# Patient Record
Sex: FEMALE | Employment: OTHER | URBAN - METROPOLITAN AREA
[De-identification: names, ages, dates, MRNs, and addresses within clinical notes are randomized per-mention and may not be internally consistent; named-entity substitution may affect disease eponyms.]

---

## 2017-04-29 ENCOUNTER — HOSPITAL ENCOUNTER (OUTPATIENT)
Dept: WOMENS IMAGING | Age: 67
Discharge: HOME | End: 2017-04-29
Attending: INTERNAL MEDICINE | Admitting: INTERNAL MEDICINE

## 2017-04-29 DIAGNOSIS — Z12.31 ENCOUNTER FOR SCREENING MAMMOGRAM FOR HIGH-RISK PATIENT: ICD-10-CM

## 2018-08-29 ENCOUNTER — HOSPITAL ENCOUNTER (OUTPATIENT)
Dept: WOMENS IMAGING | Age: 68
Discharge: HOME | End: 2018-08-29
Attending: OBSTETRICS & GYNECOLOGY | Admitting: OBSTETRICS & GYNECOLOGY

## 2018-08-29 DIAGNOSIS — Z12.31 VISIT FOR SCREENING MAMMOGRAM: ICD-10-CM

## 2019-01-04 ENCOUNTER — HOSPITAL ENCOUNTER (OUTPATIENT)
Dept: WOMENS IMAGING | Age: 69
Discharge: HOME | End: 2019-01-04
Payer: MEDICARE

## 2019-01-04 DIAGNOSIS — Z78.0 ASYMPTOMATIC MENOPAUSAL STATE: ICD-10-CM

## 2019-01-04 PROCEDURE — 77080 DXA BONE DENSITY AXIAL: CPT

## 2019-01-31 DIAGNOSIS — M81.0 SENILE OSTEOPOROSIS: ICD-10-CM

## 2019-01-31 RX ORDER — 0.9 % SODIUM CHLORIDE 0.9 %
10 VIAL (ML) INJECTION ONCE
Status: CANCELLED | OUTPATIENT
Start: 2019-01-31 | End: 2019-01-31

## 2019-01-31 RX ORDER — SODIUM CHLORIDE 9 MG/ML
100 INJECTION, SOLUTION INTRAVENOUS CONTINUOUS
Status: CANCELLED | OUTPATIENT
Start: 2019-01-31

## 2019-01-31 RX ORDER — METHYLPREDNISOLONE SODIUM SUCCINATE 125 MG/2ML
125 INJECTION, POWDER, LYOPHILIZED, FOR SOLUTION INTRAMUSCULAR; INTRAVENOUS ONCE
Status: CANCELLED | OUTPATIENT
Start: 2019-01-31 | End: 2019-01-31

## 2019-01-31 RX ORDER — DIPHENHYDRAMINE HYDROCHLORIDE 50 MG/ML
50 INJECTION INTRAMUSCULAR; INTRAVENOUS ONCE
Status: CANCELLED | OUTPATIENT
Start: 2019-01-31 | End: 2019-01-31

## 2019-01-31 RX ORDER — EPINEPHRINE 1 MG/ML
0.3 INJECTION, SOLUTION, CONCENTRATE INTRAVENOUS PRN
Status: CANCELLED | OUTPATIENT
Start: 2019-01-31

## 2019-03-07 ENCOUNTER — HOSPITAL ENCOUNTER (OUTPATIENT)
Dept: INFUSION THERAPY | Age: 69
Setting detail: INFUSION SERIES
Discharge: HOME | End: 2019-03-07
Payer: MEDICARE

## 2019-03-07 ENCOUNTER — HOSPITAL ENCOUNTER (OUTPATIENT)
Dept: INFUSION THERAPY | Age: 69
Setting detail: INFUSION SERIES
End: 2019-03-07
Payer: MEDICARE

## 2019-03-07 VITALS
DIASTOLIC BLOOD PRESSURE: 75 MMHG | HEIGHT: 65 IN | WEIGHT: 137 LBS | SYSTOLIC BLOOD PRESSURE: 111 MMHG | BODY MASS INDEX: 22.82 KG/M2 | RESPIRATION RATE: 17 BRPM | OXYGEN SATURATION: 94 % | TEMPERATURE: 97.5 F | HEART RATE: 71 BPM

## 2019-03-07 DIAGNOSIS — M81.0 SENILE OSTEOPOROSIS: Primary | ICD-10-CM

## 2019-03-07 PROCEDURE — 96372 THER/PROPH/DIAG INJ SC/IM: CPT

## 2019-03-07 PROCEDURE — 6360000002 HC RX W HCPCS: Performed by: OBSTETRICS & GYNECOLOGY

## 2019-03-07 RX ORDER — SODIUM CHLORIDE 9 MG/ML
100 INJECTION, SOLUTION INTRAVENOUS CONTINUOUS
Status: CANCELLED | OUTPATIENT
Start: 2019-03-07

## 2019-03-07 RX ORDER — EPINEPHRINE 1 MG/ML
0.3 INJECTION, SOLUTION, CONCENTRATE INTRAVENOUS PRN
Status: CANCELLED | OUTPATIENT
Start: 2019-03-07

## 2019-03-07 RX ORDER — FLUVOXAMINE MALEATE 100 MG
100 TABLET ORAL 2 TIMES DAILY
COMMUNITY

## 2019-03-07 RX ORDER — 0.9 % SODIUM CHLORIDE 0.9 %
10 VIAL (ML) INJECTION ONCE
Status: CANCELLED | OUTPATIENT
Start: 2019-03-07 | End: 2019-03-07

## 2019-03-07 RX ORDER — LEVOTHYROXINE SODIUM 0.12 MG/1
125 TABLET ORAL DAILY
COMMUNITY

## 2019-03-07 RX ORDER — CLONAZEPAM 0.5 MG/1
0.5 TABLET ORAL NIGHTLY PRN
Status: ON HOLD | COMMUNITY
End: 2022-06-14

## 2019-03-07 RX ORDER — METHYLPREDNISOLONE SODIUM SUCCINATE 125 MG/2ML
125 INJECTION, POWDER, LYOPHILIZED, FOR SOLUTION INTRAMUSCULAR; INTRAVENOUS ONCE
Status: CANCELLED | OUTPATIENT
Start: 2019-03-07 | End: 2019-03-07

## 2019-03-07 RX ORDER — MULTIVIT-MIN/IRON/FOLIC ACID/K 18-600-40
4000 CAPSULE ORAL DAILY
COMMUNITY

## 2019-03-07 RX ORDER — METOPROLOL SUCCINATE 50 MG/1
50 TABLET, EXTENDED RELEASE ORAL NIGHTLY
COMMUNITY

## 2019-03-07 RX ORDER — IBUPROFEN 200 MG
1 CAPSULE ORAL 2 TIMES DAILY
COMMUNITY

## 2019-03-07 RX ORDER — DIPHENHYDRAMINE HYDROCHLORIDE 50 MG/ML
50 INJECTION INTRAMUSCULAR; INTRAVENOUS ONCE
Status: CANCELLED | OUTPATIENT
Start: 2019-03-07 | End: 2019-03-07

## 2019-03-07 RX ADMIN — DENOSUMAB 60 MG: 60 INJECTION SUBCUTANEOUS at 09:19

## 2019-03-07 NOTE — PROGRESS NOTES
Outpatient [id number] [place] [facility]     Prolia Visit    NAME:  Jonah Nicholson  YOB: 1950  MEDICAL RECORD NUMBER:  [id number]  Episode Date:  3/7/2019    Patient arrived to [facility] 58   [] per wheelchair   [x] ambulatory     Is this the patient's first Prolia Injection? Yes     Date of last Prolia N/A    Did the patient experience any adverse reactions to Prolia Injection? NA    Any recent oral or dental surgery? no    Any recent active fever, infections and/or illnesses? No    Patient has history of pathological fracture? No    Patient has had a recent fracture due to trauma or injury? No    Patient has had a recent orthopedic surgery or procedure done? No    Approximate date of last Dexa scan? 1/4/19       There were no vitals taken for this visit. Current Lab Data:    Calcium: 9.1 on 2/28/19    Patient Currently taking Calcium Supplements? Yes      Prolia administered: Yes    Prolia dosage: 60 mg was administered slowly subcutaneously into the left upper arm. Response to treatment:  Well tolerated by patient. Due for next dose of Prolia on September 8, 2019.      Electronically signed by Ian Castro RN on 3/7/2019 at 9:10 AM

## 2019-08-21 RX ORDER — SODIUM CHLORIDE 9 MG/ML
INJECTION, SOLUTION INTRAVENOUS CONTINUOUS
Status: CANCELLED | OUTPATIENT
Start: 2019-08-21

## 2019-08-21 RX ORDER — METHYLPREDNISOLONE SODIUM SUCCINATE 125 MG/2ML
125 INJECTION, POWDER, LYOPHILIZED, FOR SOLUTION INTRAMUSCULAR; INTRAVENOUS ONCE
Status: CANCELLED | OUTPATIENT
Start: 2019-08-21

## 2019-08-21 RX ORDER — DIPHENHYDRAMINE HYDROCHLORIDE 50 MG/ML
50 INJECTION INTRAMUSCULAR; INTRAVENOUS ONCE
Status: CANCELLED | OUTPATIENT
Start: 2019-08-21

## 2019-08-21 RX ORDER — EPINEPHRINE 1 MG/ML
0.3 INJECTION, SOLUTION, CONCENTRATE INTRAVENOUS PRN
Status: CANCELLED | OUTPATIENT
Start: 2019-08-21

## 2019-09-13 ENCOUNTER — HOSPITAL ENCOUNTER (OUTPATIENT)
Dept: INFUSION THERAPY | Age: 69
Setting detail: INFUSION SERIES
Discharge: HOME | End: 2019-09-13
Payer: MEDICARE

## 2019-09-13 VITALS
RESPIRATION RATE: 18 BRPM | HEART RATE: 60 BPM | TEMPERATURE: 98.3 F | DIASTOLIC BLOOD PRESSURE: 74 MMHG | SYSTOLIC BLOOD PRESSURE: 115 MMHG

## 2019-09-13 DIAGNOSIS — M81.0 SENILE OSTEOPOROSIS: Primary | ICD-10-CM

## 2019-09-13 RX ORDER — DIPHENHYDRAMINE HYDROCHLORIDE 50 MG/ML
50 INJECTION INTRAMUSCULAR; INTRAVENOUS ONCE
Status: CANCELLED | OUTPATIENT
Start: 2020-03-13

## 2019-09-13 RX ORDER — EPINEPHRINE 1 MG/ML
0.3 INJECTION, SOLUTION, CONCENTRATE INTRAVENOUS PRN
Status: CANCELLED | OUTPATIENT
Start: 2020-03-13

## 2019-09-13 RX ORDER — METHYLPREDNISOLONE SODIUM SUCCINATE 125 MG/2ML
125 INJECTION, POWDER, LYOPHILIZED, FOR SOLUTION INTRAMUSCULAR; INTRAVENOUS ONCE
Status: CANCELLED | OUTPATIENT
Start: 2020-03-13

## 2019-09-13 RX ORDER — SODIUM CHLORIDE 9 MG/ML
INJECTION, SOLUTION INTRAVENOUS CONTINUOUS
Status: CANCELLED | OUTPATIENT
Start: 2020-03-13

## 2019-09-13 NOTE — PROGRESS NOTES
Outpatient [id number] [place] [employer] [facility]     Prolia Visit    NAME:  Viola George  YOB: 1950  MEDICAL RECORD NUMBER:  [id number]  Episode Date:  9/13/2019    Patient arrived to [facility] 58   [] per wheelchair   [x] ambulatory     Is this the patient's first Prolia Injection? No     Date of last Prolia Injection ? March 7, 2019. Did the patient experience any adverse reactions to Prolia Injection? No    Any recent oral or dental surgery? Yes had a molar removed 2-3 weeks ago and has had 2 weeks of antibiotics. Still having soreness where tooth removed. Patient states she had her Tysabri infusion yesterday for her MS. Explained to patient the risk of jaw infection is greater with recent pulled tooth and Tysabri is a monoclonal antibody which is also an immunosuppressant. Would be better if we wait for pulled tooth to heal. Patient is going to call dentist since tooth is still sore. She will call us when tooth is healed. /74   Pulse 60   Temp 98.3 °F (36.8 °C) (Oral)   Resp 18     Current Lab Data:    Calcium:    Lab Results   Component Value Date    CALCIUM 8.2 04/04/2015     Prolia was held today until tooth is healed. Patient will call us when she is ready.        Electronically signed by Carolina Mclaughlin RN on 9/13/2019 at 4:59 PM

## 2019-09-16 ENCOUNTER — HOSPITAL ENCOUNTER (OUTPATIENT)
Dept: WOMENS IMAGING | Age: 69
Discharge: HOME | End: 2019-09-16
Payer: MEDICARE

## 2019-09-16 DIAGNOSIS — Z12.39 BREAST CANCER SCREENING: ICD-10-CM

## 2019-09-16 PROCEDURE — 77063 BREAST TOMOSYNTHESIS BI: CPT

## 2019-10-15 ENCOUNTER — HOSPITAL ENCOUNTER (OUTPATIENT)
Dept: INFUSION THERAPY | Age: 69
Setting detail: INFUSION SERIES
Discharge: HOME | End: 2019-10-15
Payer: MEDICARE

## 2019-10-15 VITALS
HEART RATE: 47 BPM | OXYGEN SATURATION: 100 % | RESPIRATION RATE: 18 BRPM | DIASTOLIC BLOOD PRESSURE: 60 MMHG | SYSTOLIC BLOOD PRESSURE: 124 MMHG | TEMPERATURE: 96 F

## 2019-10-15 DIAGNOSIS — M81.0 SENILE OSTEOPOROSIS: Primary | ICD-10-CM

## 2019-10-15 PROCEDURE — 6360000002 HC RX W HCPCS: Performed by: OBSTETRICS & GYNECOLOGY

## 2019-10-15 PROCEDURE — 96372 THER/PROPH/DIAG INJ SC/IM: CPT

## 2019-10-15 RX ORDER — METHYLPREDNISOLONE SODIUM SUCCINATE 125 MG/2ML
125 INJECTION, POWDER, LYOPHILIZED, FOR SOLUTION INTRAMUSCULAR; INTRAVENOUS ONCE
Status: CANCELLED | OUTPATIENT
Start: 2020-03-11

## 2019-10-15 RX ORDER — SODIUM CHLORIDE 9 MG/ML
INJECTION, SOLUTION INTRAVENOUS CONTINUOUS
Status: CANCELLED | OUTPATIENT
Start: 2020-03-11

## 2019-10-15 RX ORDER — CLOMIPRAMINE HYDROCHLORIDE 75 MG/1
250 CAPSULE ORAL DAILY
COMMUNITY
End: 2020-08-19

## 2019-10-15 RX ORDER — EPINEPHRINE 1 MG/ML
0.3 INJECTION, SOLUTION, CONCENTRATE INTRAVENOUS PRN
Status: CANCELLED | OUTPATIENT
Start: 2020-03-11

## 2019-10-15 RX ORDER — DIPHENHYDRAMINE HYDROCHLORIDE 50 MG/ML
50 INJECTION INTRAMUSCULAR; INTRAVENOUS ONCE
Status: CANCELLED | OUTPATIENT
Start: 2020-03-11

## 2019-10-15 RX ADMIN — DENOSUMAB 60 MG: 60 INJECTION SUBCUTANEOUS at 11:45

## 2019-10-15 NOTE — PROGRESS NOTES
Outpatient [id number] [place] [facility]     Prolia Visit    NAME:  Josefina Angulo  YOB: 1950  MEDICAL RECORD NUMBER:  [id number]  Episode Date:  10/15/2019    Patient arrived to [facility] 58   [] per wheelchair   [x] ambulatory  With cane    Is this the patient's first Prolia Injection? No     Date of last Prolia Injection ? March 7, 2019. Did the patient experience any adverse reactions to Prolia Injection? No    Any recent oral or dental surgery? Yes , Molar removed 8/27/19 and was on antibiotics for a while. Came for Prolia on 9/13/19 but prolia was held because she was still having a lot of soreness especially when trying to chew food. She is also on Tsabri infusions monthly for MS which is also an immunosuppressant. States tooth site is completely healed now and she is able to chew food without any problems. Any recent active fever, infections and/or illnesses? No    Patient has history of pathological fracture? No    Patient has had a recent fracture due to trauma or injury? No    Patient has had a recent orthopedic surgery or procedure done? No    Approximate date of last Dexa scan? 1/4/19      /60   Pulse (!) 47   Temp 96 °F (35.6 °C) (Oral)   Resp 18   SpO2 100%     Current Lab Data: from 8/19/19    Calcium:  9.4    Patient Currently taking Calcium Supplements? Yes and also on Vit D daily. Prolia administered: Yes    Prolia dosage: 60 mg was administered slowly subcutaneously into the left upper arm. Response to treatment:  Well tolerated by patient. Due for next dose of Prolia in April , 2020.      Electronically signed by Huong Tirado RN on 10/15/2019 at 11:59 am.

## 2020-08-19 NOTE — PROGRESS NOTES
[address] [facility] time____0830________        Surgery time___0930_________    Take the following medications with a sip of water:    Do not eat or drink anything after 12:00 midnight prior to your surgery. except prep  This includes water chewing gum, mints and ice chips. You may brush your teeth and gargle the morning of your surgery, but do not swallow the water      You may be asked to stop blood thinners such as Coumadin, Plavix, Fragmin, Lovenox, etc., or any anti-inflammatories such as:  Aspirin, Ibuprofen, Advil, Naproxen prior to your surgery. We also ask that you stop any OTC medications such as fish oil, vitamin E, glucosamine, garlic, Multivitamins, COQ 10, etc.    We ask that you do not smoke 24 hours prior to surgery  We ask that you do not  drink any alcoholic beverages 24 hours prior to surgery     You must make arrangements for a responsible adult to take you home after your surgery. For your safety you will not be allowed to leave alone or drive yourself home. Your surgery will be cancelled if you do not have a ride home. Also for your safety, it is strongly suggested that someone stay with you the first 24 hours after your surgery. A parent or legal guardian must accompany a child scheduled for surgery and plan to stay at the hospital until the child is discharged. Please do not bring other children with you. For your comfort, please wear simple loose fitting clothing to the hospital.  Please do not bring valuables. Do not wear any make-up or nail polish on your fingers or toes      For your safety, please do not wear any jewelry or body piercing's on the day of surgery. All jewelry must be removed. If you have dentures, they will be removed before going to operating room. For your convenience, we will provide you with a container.     If you wear contact lenses or glasses, they will be removed, please bring a case for

## 2020-08-20 ENCOUNTER — OFFICE VISIT (OUTPATIENT)
Dept: PRIMARY CARE CLINIC | Age: 70
End: 2020-08-20
Payer: COMMERCIAL

## 2020-08-20 PROCEDURE — G8420 CALC BMI NORM PARAMETERS: HCPCS | Performed by: NURSE PRACTITIONER

## 2020-08-20 PROCEDURE — 99211 OFF/OP EST MAY X REQ PHY/QHP: CPT | Performed by: NURSE PRACTITIONER

## 2020-08-20 PROCEDURE — G8428 CUR MEDS NOT DOCUMENT: HCPCS | Performed by: NURSE PRACTITIONER

## 2020-08-20 NOTE — PROGRESS NOTES
Patient presented to [facility] drive up clinic for preop testing. Patient was swabbed and given information advising them to remain isolated until procedure date.

## 2020-08-24 LAB — SARS-COV-2, NAA: NOT DETECTED

## 2020-08-25 ENCOUNTER — ANESTHESIA EVENT (OUTPATIENT)
Dept: ENDOSCOPY | Age: 70
End: 2020-08-25
Payer: MEDICARE

## 2020-08-26 ENCOUNTER — ANESTHESIA (OUTPATIENT)
Dept: ENDOSCOPY | Age: 70
End: 2020-08-26
Payer: MEDICARE

## 2020-08-26 ENCOUNTER — HOSPITAL ENCOUNTER (OUTPATIENT)
Age: 70
Setting detail: OUTPATIENT SURGERY
Discharge: HOME | End: 2020-08-26
Attending: INTERNAL MEDICINE | Admitting: INTERNAL MEDICINE
Payer: MEDICARE

## 2020-08-26 VITALS
BODY MASS INDEX: 24.83 KG/M2 | HEIGHT: 65 IN | TEMPERATURE: 97.8 F | WEIGHT: 149 LBS | SYSTOLIC BLOOD PRESSURE: 115 MMHG | RESPIRATION RATE: 16 BRPM | OXYGEN SATURATION: 98 % | HEART RATE: 73 BPM | DIASTOLIC BLOOD PRESSURE: 53 MMHG

## 2020-08-26 VITALS
RESPIRATION RATE: 15 BRPM | SYSTOLIC BLOOD PRESSURE: 137 MMHG | DIASTOLIC BLOOD PRESSURE: 77 MMHG | OXYGEN SATURATION: 99 %

## 2020-08-26 PROCEDURE — 7100000010 HC PHASE II RECOVERY - FIRST 15 MIN: Performed by: INTERNAL MEDICINE

## 2020-08-26 PROCEDURE — 6360000002 HC RX W HCPCS: Performed by: NURSE ANESTHETIST, CERTIFIED REGISTERED

## 2020-08-26 PROCEDURE — 2580000003 HC RX 258: Performed by: ANESTHESIOLOGY

## 2020-08-26 PROCEDURE — 3700000000 HC ANESTHESIA ATTENDED CARE: Performed by: INTERNAL MEDICINE

## 2020-08-26 PROCEDURE — 3700000001 HC ADD 15 MINUTES (ANESTHESIA): Performed by: INTERNAL MEDICINE

## 2020-08-26 PROCEDURE — 2500000003 HC RX 250 WO HCPCS: Performed by: NURSE ANESTHETIST, CERTIFIED REGISTERED

## 2020-08-26 PROCEDURE — 3609027000 HC COLONOSCOPY: Performed by: INTERNAL MEDICINE

## 2020-08-26 PROCEDURE — 7100000011 HC PHASE II RECOVERY - ADDTL 15 MIN: Performed by: INTERNAL MEDICINE

## 2020-08-26 RX ORDER — SODIUM CHLORIDE 0.9 % (FLUSH) 0.9 %
10 SYRINGE (ML) INJECTION PRN
Status: DISCONTINUED | OUTPATIENT
Start: 2020-08-26 | End: 2020-08-26 | Stop reason: HOSPADM

## 2020-08-26 RX ORDER — LIDOCAINE HYDROCHLORIDE 20 MG/ML
INJECTION, SOLUTION EPIDURAL; INFILTRATION; INTRACAUDAL; PERINEURAL PRN
Status: DISCONTINUED | OUTPATIENT
Start: 2020-08-26 | End: 2020-08-26 | Stop reason: SDUPTHER

## 2020-08-26 RX ORDER — GLYCOPYRROLATE 0.2 MG/ML
INJECTION INTRAMUSCULAR; INTRAVENOUS PRN
Status: DISCONTINUED | OUTPATIENT
Start: 2020-08-26 | End: 2020-08-26 | Stop reason: SDUPTHER

## 2020-08-26 RX ORDER — SODIUM CHLORIDE 9 MG/ML
INJECTION, SOLUTION INTRAVENOUS CONTINUOUS
Status: DISCONTINUED | OUTPATIENT
Start: 2020-08-26 | End: 2020-08-26 | Stop reason: HOSPADM

## 2020-08-26 RX ORDER — SODIUM CHLORIDE 0.9 % (FLUSH) 0.9 %
10 SYRINGE (ML) INJECTION EVERY 12 HOURS SCHEDULED
Status: DISCONTINUED | OUTPATIENT
Start: 2020-08-26 | End: 2020-08-26 | Stop reason: HOSPADM

## 2020-08-26 RX ORDER — PROPOFOL 10 MG/ML
INJECTION, EMULSION INTRAVENOUS PRN
Status: DISCONTINUED | OUTPATIENT
Start: 2020-08-26 | End: 2020-08-26 | Stop reason: SDUPTHER

## 2020-08-26 RX ADMIN — LIDOCAINE HYDROCHLORIDE 50 MG: 20 INJECTION, SOLUTION EPIDURAL; INFILTRATION; INTRACAUDAL; PERINEURAL at 09:40

## 2020-08-26 RX ADMIN — GLYCOPYRROLATE 0.2 MG: 0.2 INJECTION, SOLUTION INTRAMUSCULAR; INTRAVENOUS at 09:45

## 2020-08-26 RX ADMIN — PROPOFOL 20 MG: 10 INJECTION, EMULSION INTRAVENOUS at 09:44

## 2020-08-26 RX ADMIN — PROPOFOL 50 MG: 10 INJECTION, EMULSION INTRAVENOUS at 09:40

## 2020-08-26 RX ADMIN — PROPOFOL 20 MG: 10 INJECTION, EMULSION INTRAVENOUS at 09:42

## 2020-08-26 RX ADMIN — SODIUM CHLORIDE: 9 INJECTION, SOLUTION INTRAVENOUS at 09:21

## 2020-08-26 RX ADMIN — PROPOFOL 20 MG: 10 INJECTION, EMULSION INTRAVENOUS at 09:46

## 2020-08-26 RX ADMIN — PROPOFOL 20 MG: 10 INJECTION, EMULSION INTRAVENOUS at 09:48

## 2020-08-26 ASSESSMENT — PAIN SCALES - WONG BAKER
WONGBAKER_NUMERICALRESPONSE: 0

## 2020-08-26 ASSESSMENT — PAIN SCALES - GENERAL
PAINLEVEL_OUTOF10: 0

## 2020-08-26 ASSESSMENT — PAIN - FUNCTIONAL ASSESSMENT: PAIN_FUNCTIONAL_ASSESSMENT: 0-10

## 2020-08-26 NOTE — H&P
[facility]   Pre-operative History and Physical    Patient: Gaby Welch  : 1950  Acct#: [de-identified]    History Obtained From: electronic medical record    HISTORY OF PRESENT ILLNESS  Procedure:Colonoscopy  Indications:surveillance  Past Medical History:        Diagnosis Date    Multiple sclerosis ([address]     OCD (obsessive compulsive disorder)     Osteopenia     Syncopal episodes     Thyroid disease      Past Surgical History:        Procedure Laterality Date     SECTION      COLONOSCOPY      SINUS SURGERY      TONSILLECTOMY       Medications prior to admission:   Prior to Admission medications    Medication Sig Start Date End Date Taking? Authorizing Provider   levothyroxine (SYNTHROID) 125 MCG tablet Take 125 mcg by mouth Daily   Yes Historical Provider, MD   fluvoxaMINE (LUVOX) 100 MG tablet Take 100 mg by mouth 2 times daily   Yes Historical Provider, MD   natalizumab (TYSABRI) 300 MG/15ML injection Infuse intravenously every 28 days   Yes Historical Provider, MD   metoprolol succinate (TOPROL XL) 50 MG extended release tablet Take 50 mg by mouth nightly   Yes Historical Provider, MD   clonazePAM (KLONOPIN) 0.5 MG tablet Take 0.5 mg by mouth 2 times daily as needed. Yes Historical Provider, MD   calcium citrate (CALCITRATE) 950 MG tablet Take 1 tablet by mouth 2 times daily    Yes Historical Provider, MD   Cholecalciferol (VITAMIN D) 2000 units CAPS capsule Take 4,000 capsules by mouth daily   Yes Historical Provider, MD   clonazePAM (KLONOPIN) 1 MG tablet  3/13/15  Yes Historical Provider, MD   docusate sodium (COLACE) 100 MG capsule Take 1 capsule by mouth 2 times daily. 4/4/15  Yes Stone Amaya PA-C     Allergies:   Patient has no known allergies.     Social History     Socioeconomic History    Marital status:      Spouse name: Not on file    Number of children: Not on file    Years of education: Not on file    Highest education level: Not on file Occupational History    Not on file   Social Needs    Financial resource strain: Not on file    Food insecurity     Worry: Not on file     Inability: Not on file    Transportation needs     Medical: Not on file     Non-medical: Not on file   Tobacco Use    Smoking status: Former Smoker    Smokeless tobacco: Never Used   Substance and Sexual Activity    Alcohol use: No    Drug use: No    Sexual activity: Not on file   Lifestyle    Physical activity     Days per week: Not on file     Minutes per session: Not on file    Stress: Not on file   Relationships    Social connections     Talks on phone: Not on file     Gets together: Not on file     Attends Orthodoxy service: Not on file     Active member of club or organization: Not on file     Attends meetings of clubs or organizations: Not on file     Relationship status: Not on file    Intimate partner violence     Fear of current or ex partner: Not on file     Emotionally abused: Not on file     Physically abused: Not on file     Forced sexual activity: Not on file   Other Topics Concern    Not on file   Social History Narrative    Not on file     Family History   Problem Relation Age of Onset    Stroke Mother     Other Father         alheizmers         PHYSICAL EXAM:      BP (!) 113/50   Pulse 64   Temp 98 °F (36.7 °C) (Temporal)   Resp 16   Ht 5' 5\" (1.651 m)   Wt 149 lb (67.6 kg)   SpO2 97%   BMI 24.79 kg/m²  I        Heart:normal    Lungs: normal    Abdomen: normal      ASA Grade:  See anesthesia note      ASSESSMENT AND PLAN:    1. Procedure options, risks and benefits reviewed with patient and expresses understanding.

## 2020-08-26 NOTE — ANESTHESIA PRE PROCEDURE
mouth 2 times daily as needed.  calcium citrate (CALCITRATE) 950 MG tablet Take 1 tablet by mouth 2 times daily       Cholecalciferol (VITAMIN D) 2000 units CAPS capsule Take 4,000 capsules by mouth daily      clonazePAM (KLONOPIN) 1 MG tablet       docusate sodium (COLACE) 100 MG capsule Take 1 capsule by mouth 2 times daily. 20 capsule 0       Allergies:  No Known Allergies    Problem List:    Patient Active Problem List   Diagnosis Code    Senile osteoporosis M81.0       Past Medical History:        Diagnosis Date    Multiple sclerosis [address] [address]     OCD (obsessive compulsive disorder)     Osteopenia     Syncopal episodes     Thyroid disease        Past Surgical History:        Procedure Laterality Date     SECTION      COLONOSCOPY      SINUS SURGERY      TONSILLECTOMY         Social History:    Social History     Tobacco Use    Smoking status: Former Smoker    Smokeless tobacco: Never Used   Substance Use Topics    Alcohol use: No                                Counseling given: Not Answered      Vital Signs (Current):   Vitals:    20 0929   Weight: 149 lb (67.6 kg)   Height: 5' 5\" (1.651 m)                                              BP Readings from Last 3 Encounters:   10/15/19 124/60   19 115/74   19 111/75       NPO Status:                                                                                 BMI:   Wt Readings from Last 3 Encounters:   19 137 lb (62.1 kg)   05/04/15 197 lb (89.4 kg)   04/09/15 191 lb (86.6 kg)     Body mass index is 24.79 kg/m².     CBC:   Lab Results   Component Value Date    WBC 9.4 2015    RBC 4.03 2015    HGB 13.4 2015    HCT 38.6 2015    MCV 95.8 2015    RDW 12.7 2015     2015       CMP:   Lab Results   Component Value Date     2015    K 4.1 2015     2015    CO2 25 2015    BUN 13 2015    CREATININE 0.6 2015    GFRAA >60 2015 AGRATIO 1.3 04/04/2015    LABGLOM >60 04/04/2015    GLUCOSE 111 04/04/2015    PROT 6.1 04/04/2015    CALCIUM 8.2 04/04/2015    BILITOT 0.3 04/04/2015    ALKPHOS 55 04/04/2015    AST 23 04/04/2015    ALT 16 04/04/2015       POC Tests: No results for input(s): POCGLU, POCNA, POCK, POCCL, POCBUN, POCHEMO, POCHCT in the last 72 hours. Coags: No results found for: PROTIME, INR, APTT    HCG (If Applicable): No results found for: PREGTESTUR, PREGSERUM, HCG, HCGQUANT     ABGs: No results found for: PHART, PO2ART, QNN6RAS, CAA4TSU, BEART, T6BGKNZN     Type & Screen (If Applicable):  No results found for: LABABO, LABRH    Drug/Infectious Status (If Applicable):  No results found for: HIV, HEPCAB    COVID-19 Screening (If Applicable):   Lab Results   Component Value Date    COVID19 NOT DETECTED 08/20/2020         Anesthesia Evaluation  Patient summary reviewed and Nursing notes reviewed no history of anesthetic complications:   Airway: Mallampati: II  TM distance: >3 FB   Neck ROM: full  Mouth opening: > = 3 FB Dental: normal exam         Pulmonary:Negative Pulmonary ROS                              Cardiovascular:Negative CV ROS  Exercise tolerance: good (>4 METS),           Rhythm: regular                      Neuro/Psych:   (+) psychiatric history:   (-) seizures, neuromuscular disease, TIA, CVA, headaches and depression/anxiety             ROS comment: Multiple sclerosis symptoms include fatigue and some leg weakness GI/Hepatic/Renal:   (+) bowel prep,      (-) hiatal hernia, GERD, PUD, hepatitis, liver disease, no renal disease and no morbid obesity       Endo/Other: Negative Endo/Other ROS                    Abdominal:           Vascular: negative vascular ROS. Anesthesia Plan      MAC     ASA 3     (Prefers a facemask to nasal cannula)  Induction: intravenous. Anesthetic plan and risks discussed with patient. Plan discussed with CHARLES.                 [facility] Judith Enamorado MD   8/26/2020        This pre-anesthesia assessment may be used as a history and physical.    DOS STAFF ADDENDUM:    Pt seen and examined, chart reviewed (including anesthesia, drug and allergy history). No interval changes to history and physical examination. Anesthetic plan, risks, benefits, alternatives, and personnel involved discussed with patient. Patient verbalized an understanding and agrees to proceed.       Beryl Lema MD  August 26, 2020  7:28 AM

## 2020-08-26 NOTE — PROGRESS NOTES
Dr. Bharat Mcdermott here to speak with pt. And her . Patient and responsible adult verbalized understanding of discharge instructions, sedation medication, and potential complications including pain. Patient instructed to call Doctor if complications occur.

## 2020-08-26 NOTE — ANESTHESIA POSTPROCEDURE EVALUATION
Department of Anesthesiology  Postprocedure Note    Patient: Trino Young  MRN: [id number]  YOB: 1950  Date of evaluation: 8/26/2020  Time:  11:12 AM     Procedure Summary     Date:  08/26/20 Room / Location:  [address] [place] [address]    Anesthesia Start:  0930 Anesthesia Stop:  9338    Procedure:  COLONOSCOPY DIAGNOSTIC (N/A ) Diagnosis:       History of colon polyps      (HISTORY OF POLYPS)    Surgeon:  Kai Tenorio MD Responsible Provider:  Davina Haque MD    Anesthesia Type:  MAC ASA Status:  3          Anesthesia Type: MAC    Anshu Phase I: Anshu Score: 10    Anshu Phase II: Anshu Score: 10    Last vitals: Reviewed and per EMR flowsheets.        Anesthesia Post Evaluation    Patient location during evaluation: PACU  Patient participation: complete - patient participated  Level of consciousness: awake and alert  Pain score: 0  Airway patency: patent  Nausea & Vomiting: no nausea and no vomiting  Complications: no  Cardiovascular status: blood pressure returned to baseline  Respiratory status: acceptable  Hydration status: euvolemic

## 2020-12-04 ENCOUNTER — HOSPITAL ENCOUNTER (OUTPATIENT)
Dept: WOMENS IMAGING | Age: 70
Discharge: HOME | End: 2020-12-04
Payer: MEDICARE

## 2020-12-04 PROCEDURE — 77063 BREAST TOMOSYNTHESIS BI: CPT

## 2021-07-04 ENCOUNTER — APPOINTMENT (OUTPATIENT)
Dept: GENERAL RADIOLOGY | Age: 71
DRG: 862 | End: 2021-07-04
Payer: MEDICARE

## 2021-07-04 ENCOUNTER — HOSPITAL ENCOUNTER (INPATIENT)
Age: 71
LOS: 2 days | Discharge: HOME | DRG: 862 | End: 2021-07-06
Attending: INTERNAL MEDICINE | Admitting: INTERNAL MEDICINE
Payer: MEDICARE

## 2021-07-04 DIAGNOSIS — A41.9 SEPTICEMIA (HCC): Primary | ICD-10-CM

## 2021-07-04 DIAGNOSIS — J18.9 PNEUMONIA DUE TO INFECTIOUS ORGANISM, UNSPECIFIED LATERALITY, UNSPECIFIED PART OF LUNG: ICD-10-CM

## 2021-07-04 DIAGNOSIS — N30.00 ACUTE CYSTITIS WITHOUT HEMATURIA: ICD-10-CM

## 2021-07-04 LAB
A/G RATIO: 1.6 (ref 1.1–2.2)
ALBUMIN SERPL-MCNC: 3.9 G/DL (ref 3.4–5)
ALP BLD-CCNC: 104 U/L (ref 40–129)
ALT SERPL-CCNC: 14 U/L (ref 10–40)
ANION GAP SERPL CALCULATED.3IONS-SCNC: 10 MMOL/L (ref 3–16)
AST SERPL-CCNC: 19 U/L (ref 15–37)
BACTERIA: ABNORMAL /HPF
BASOPHILS ABSOLUTE: 0 K/UL (ref 0–0.2)
BASOPHILS RELATIVE PERCENT: 0.5 %
BILIRUB SERPL-MCNC: 0.8 MG/DL (ref 0–1)
BILIRUBIN URINE: NEGATIVE
BLOOD, URINE: ABNORMAL
BUN BLDV-MCNC: 21 MG/DL (ref 7–20)
CALCIUM SERPL-MCNC: 9.1 MG/DL (ref 8.3–10.6)
CHLORIDE BLD-SCNC: 98 MMOL/L (ref 99–110)
CLARITY: ABNORMAL
CO2: 28 MMOL/L (ref 21–32)
COLOR: YELLOW
CREAT SERPL-MCNC: 1 MG/DL (ref 0.6–1.2)
EOSINOPHILS ABSOLUTE: 0.1 K/UL (ref 0–0.6)
EOSINOPHILS RELATIVE PERCENT: 1 %
EPITHELIAL CELLS, UA: 0 /HPF (ref 0–5)
GFR AFRICAN AMERICAN: >60
GFR NON-AFRICAN AMERICAN: 55
GLOBULIN: 2.5 G/DL
GLUCOSE BLD-MCNC: 142 MG/DL (ref 70–99)
GLUCOSE URINE: NEGATIVE MG/DL
HCT VFR BLD CALC: 36.2 % (ref 36–48)
HEMOGLOBIN: 12.6 G/DL (ref 12–16)
HYALINE CASTS: 0 /LPF (ref 0–8)
KETONES, URINE: NEGATIVE MG/DL
LACTIC ACID: 2 MMOL/L (ref 0.4–2)
LACTIC ACID: 2.2 MMOL/L (ref 0.4–2)
LEUKOCYTE ESTERASE, URINE: ABNORMAL
LYMPHOCYTES ABSOLUTE: 1.3 K/UL (ref 1–5.1)
LYMPHOCYTES RELATIVE PERCENT: 14 %
MCH RBC QN AUTO: 33.5 PG (ref 26–34)
MCHC RBC AUTO-ENTMCNC: 34.7 G/DL (ref 31–36)
MCV RBC AUTO: 96.6 FL (ref 80–100)
MICROSCOPIC EXAMINATION: YES
MONOCYTES ABSOLUTE: 0.1 K/UL (ref 0–1.3)
MONOCYTES RELATIVE PERCENT: 1.3 %
NEUTROPHILS ABSOLUTE: 8 K/UL (ref 1.7–7.7)
NEUTROPHILS RELATIVE PERCENT: 83.2 %
NITRITE, URINE: NEGATIVE
PDW BLD-RTO: 13.2 % (ref 12.4–15.4)
PH UA: 6 (ref 5–8)
PLATELET # BLD: 138 K/UL (ref 135–450)
PMV BLD AUTO: 8.4 FL (ref 5–10.5)
POTASSIUM SERPL-SCNC: 4.3 MMOL/L (ref 3.5–5.1)
PROCALCITONIN: 3 NG/ML (ref 0–0.15)
PROTEIN UA: 30 MG/DL
RBC # BLD: 3.74 M/UL (ref 4–5.2)
RBC UA: 8 /HPF (ref 0–4)
SARS-COV-2, NAAT: NOT DETECTED
SODIUM BLD-SCNC: 136 MMOL/L (ref 136–145)
SPECIFIC GRAVITY UA: 1.02 (ref 1–1.03)
TOTAL PROTEIN: 6.4 G/DL (ref 6.4–8.2)
TROPONIN: <0.01 NG/ML
URINE REFLEX TO CULTURE: YES
URINE TYPE: ABNORMAL
UROBILINOGEN, URINE: 0.2 E.U./DL
WBC # BLD: 9.7 K/UL (ref 4–11)
WBC UA: 509 /HPF (ref 0–5)

## 2021-07-04 PROCEDURE — 71045 X-RAY EXAM CHEST 1 VIEW: CPT

## 2021-07-04 PROCEDURE — 2060000000 HC ICU INTERMEDIATE R&B

## 2021-07-04 PROCEDURE — 51798 US URINE CAPACITY MEASURE: CPT

## 2021-07-04 PROCEDURE — 96365 THER/PROPH/DIAG IV INF INIT: CPT

## 2021-07-04 PROCEDURE — 87086 URINE CULTURE/COLONY COUNT: CPT

## 2021-07-04 PROCEDURE — 87040 BLOOD CULTURE FOR BACTERIA: CPT

## 2021-07-04 PROCEDURE — 6360000002 HC RX W HCPCS: Performed by: PHYSICIAN ASSISTANT

## 2021-07-04 PROCEDURE — 36415 COLL VENOUS BLD VENIPUNCTURE: CPT

## 2021-07-04 PROCEDURE — 6370000000 HC RX 637 (ALT 250 FOR IP): Performed by: PHYSICIAN ASSISTANT

## 2021-07-04 PROCEDURE — 2580000003 HC RX 258: Performed by: INTERNAL MEDICINE

## 2021-07-04 PROCEDURE — 99285 EMERGENCY DEPT VISIT HI MDM: CPT

## 2021-07-04 PROCEDURE — 93005 ELECTROCARDIOGRAM TRACING: CPT | Performed by: PHYSICIAN ASSISTANT

## 2021-07-04 PROCEDURE — 96361 HYDRATE IV INFUSION ADD-ON: CPT

## 2021-07-04 PROCEDURE — 81001 URINALYSIS AUTO W/SCOPE: CPT

## 2021-07-04 PROCEDURE — 84145 PROCALCITONIN (PCT): CPT

## 2021-07-04 PROCEDURE — 6360000002 HC RX W HCPCS: Performed by: INTERNAL MEDICINE

## 2021-07-04 PROCEDURE — 85025 COMPLETE CBC W/AUTO DIFF WBC: CPT

## 2021-07-04 PROCEDURE — 83605 ASSAY OF LACTIC ACID: CPT

## 2021-07-04 PROCEDURE — 87088 URINE BACTERIA CULTURE: CPT

## 2021-07-04 PROCEDURE — 84484 ASSAY OF TROPONIN QUANT: CPT

## 2021-07-04 PROCEDURE — 2580000003 HC RX 258: Performed by: PHYSICIAN ASSISTANT

## 2021-07-04 PROCEDURE — 87635 SARS-COV-2 COVID-19 AMP PRB: CPT

## 2021-07-04 PROCEDURE — 6370000000 HC RX 637 (ALT 250 FOR IP): Performed by: INTERNAL MEDICINE

## 2021-07-04 PROCEDURE — 87449 NOS EACH ORGANISM AG IA: CPT

## 2021-07-04 PROCEDURE — 51701 INSERT BLADDER CATHETER: CPT

## 2021-07-04 PROCEDURE — 80053 COMPREHEN METABOLIC PANEL: CPT

## 2021-07-04 PROCEDURE — 87186 SC STD MICRODIL/AGAR DIL: CPT

## 2021-07-04 RX ORDER — 0.9 % SODIUM CHLORIDE 0.9 %
750 INTRAVENOUS SOLUTION INTRAVENOUS ONCE
Status: COMPLETED | OUTPATIENT
Start: 2021-07-04 | End: 2021-07-04

## 2021-07-04 RX ORDER — ONDANSETRON 4 MG/1
4 TABLET, ORALLY DISINTEGRATING ORAL EVERY 8 HOURS PRN
Status: DISCONTINUED | OUTPATIENT
Start: 2021-07-04 | End: 2021-07-06 | Stop reason: HOSPADM

## 2021-07-04 RX ORDER — CHLORAL HYDRATE 500 MG
1000 CAPSULE ORAL DAILY
COMMUNITY

## 2021-07-04 RX ORDER — POLYETHYLENE GLYCOL 3350 17 G/17G
17 POWDER, FOR SOLUTION ORAL EVERY MORNING
COMMUNITY

## 2021-07-04 RX ORDER — ACETAMINOPHEN 650 MG/1
650 SUPPOSITORY RECTAL EVERY 6 HOURS PRN
Status: DISCONTINUED | OUTPATIENT
Start: 2021-07-04 | End: 2021-07-06 | Stop reason: HOSPADM

## 2021-07-04 RX ORDER — ACETAMINOPHEN 325 MG/1
650 TABLET ORAL ONCE
Status: COMPLETED | OUTPATIENT
Start: 2021-07-04 | End: 2021-07-04

## 2021-07-04 RX ORDER — SODIUM CHLORIDE 0.9 % (FLUSH) 0.9 %
10 SYRINGE (ML) INJECTION EVERY 12 HOURS SCHEDULED
Status: DISCONTINUED | OUTPATIENT
Start: 2021-07-04 | End: 2021-07-06 | Stop reason: HOSPADM

## 2021-07-04 RX ORDER — METOPROLOL SUCCINATE 50 MG/1
50 TABLET, EXTENDED RELEASE ORAL NIGHTLY
Status: DISCONTINUED | OUTPATIENT
Start: 2021-07-04 | End: 2021-07-06 | Stop reason: HOSPADM

## 2021-07-04 RX ORDER — CLONAZEPAM 0.5 MG/1
1.5 TABLET ORAL NIGHTLY PRN
Status: DISCONTINUED | OUTPATIENT
Start: 2021-07-04 | End: 2021-07-06 | Stop reason: HOSPADM

## 2021-07-04 RX ORDER — ONDANSETRON 2 MG/ML
4 INJECTION INTRAMUSCULAR; INTRAVENOUS EVERY 6 HOURS PRN
Status: DISCONTINUED | OUTPATIENT
Start: 2021-07-04 | End: 2021-07-06 | Stop reason: HOSPADM

## 2021-07-04 RX ORDER — SODIUM CHLORIDE 9 MG/ML
25 INJECTION, SOLUTION INTRAVENOUS PRN
Status: DISCONTINUED | OUTPATIENT
Start: 2021-07-04 | End: 2021-07-06 | Stop reason: HOSPADM

## 2021-07-04 RX ORDER — ASPIRIN 81 MG/1
81 TABLET ORAL DAILY
COMMUNITY

## 2021-07-04 RX ORDER — SODIUM CHLORIDE 9 MG/ML
INJECTION, SOLUTION INTRAVENOUS CONTINUOUS
Status: DISCONTINUED | OUTPATIENT
Start: 2021-07-04 | End: 2021-07-06

## 2021-07-04 RX ORDER — SODIUM CHLORIDE 0.9 % (FLUSH) 0.9 %
10 SYRINGE (ML) INJECTION PRN
Status: DISCONTINUED | OUTPATIENT
Start: 2021-07-04 | End: 2021-07-06 | Stop reason: HOSPADM

## 2021-07-04 RX ORDER — POTASSIUM CHLORIDE 7.45 MG/ML
10 INJECTION INTRAVENOUS PRN
Status: DISCONTINUED | OUTPATIENT
Start: 2021-07-04 | End: 2021-07-06 | Stop reason: HOSPADM

## 2021-07-04 RX ORDER — FLUVOXAMINE MALEATE 50 MG/1
100 TABLET, COATED ORAL 2 TIMES DAILY
Status: DISCONTINUED | OUTPATIENT
Start: 2021-07-04 | End: 2021-07-06 | Stop reason: HOSPADM

## 2021-07-04 RX ORDER — CLONAZEPAM 0.5 MG/1
1 TABLET ORAL 2 TIMES DAILY PRN
Status: DISCONTINUED | OUTPATIENT
Start: 2021-07-04 | End: 2021-07-04

## 2021-07-04 RX ORDER — ACETAMINOPHEN 325 MG/1
650 TABLET ORAL EVERY 6 HOURS PRN
Status: DISCONTINUED | OUTPATIENT
Start: 2021-07-04 | End: 2021-07-06 | Stop reason: HOSPADM

## 2021-07-04 RX ORDER — MAGNESIUM SULFATE IN WATER 40 MG/ML
2000 INJECTION, SOLUTION INTRAVENOUS PRN
Status: DISCONTINUED | OUTPATIENT
Start: 2021-07-04 | End: 2021-07-06 | Stop reason: HOSPADM

## 2021-07-04 RX ORDER — 0.9 % SODIUM CHLORIDE 0.9 %
500 INTRAVENOUS SOLUTION INTRAVENOUS ONCE
Status: COMPLETED | OUTPATIENT
Start: 2021-07-04 | End: 2021-07-04

## 2021-07-04 RX ORDER — 0.9 % SODIUM CHLORIDE 0.9 %
1000 INTRAVENOUS SOLUTION INTRAVENOUS ONCE
Status: COMPLETED | OUTPATIENT
Start: 2021-07-04 | End: 2021-07-04

## 2021-07-04 RX ADMIN — FLUVOXAMINE MALEATE 100 MG: 50 TABLET, COATED ORAL at 21:04

## 2021-07-04 RX ADMIN — Medication 10 ML: at 21:05

## 2021-07-04 RX ADMIN — SODIUM CHLORIDE 500 ML: 9 INJECTION, SOLUTION INTRAVENOUS at 21:05

## 2021-07-04 RX ADMIN — Medication 1250 MG: at 21:04

## 2021-07-04 RX ADMIN — ACETAMINOPHEN 650 MG: 325 TABLET ORAL at 16:51

## 2021-07-04 RX ADMIN — SODIUM CHLORIDE 1000 ML: 9 INJECTION, SOLUTION INTRAVENOUS at 16:51

## 2021-07-04 RX ADMIN — SODIUM CHLORIDE: 9 INJECTION, SOLUTION INTRAVENOUS at 23:31

## 2021-07-04 RX ADMIN — CEFEPIME HYDROCHLORIDE 2000 MG: 2 INJECTION, POWDER, FOR SOLUTION INTRAVENOUS at 17:36

## 2021-07-04 RX ADMIN — SODIUM CHLORIDE 750 ML: 9 INJECTION, SOLUTION INTRAVENOUS at 17:36

## 2021-07-04 ASSESSMENT — PAIN SCALES - GENERAL
PAINLEVEL_OUTOF10: 0
PAINLEVEL_OUTOF10: 8

## 2021-07-04 ASSESSMENT — ENCOUNTER SYMPTOMS
VOMITING: 0
ABDOMINAL PAIN: 0
COUGH: 1
CHEST TIGHTNESS: 0
SHORTNESS OF BREATH: 0
NAUSEA: 1

## 2021-07-04 ASSESSMENT — PAIN DESCRIPTION - PAIN TYPE: TYPE: ACUTE PAIN

## 2021-07-04 NOTE — H&P
Hospital Medicine History & Physical      PCP: Kraig Coleman MD    Date of Admission: 2021    Chief Complaint:  Chills    History Of Present Illness:  Patient is a 69-year-old female with past medical history of multiple sclerosis, hypothyroidism who presents to the hospital for chills at home. According to the patient for the past one night she has been feeling chills, also had subjective fevers. Patient has urinary frequency, patient recently had Botox procedure of her bladder with instrumentation. Patient has mild cough, not sure about phlegm color and not bringing up much phlegm. Patient otherwise denies diarrhea constipation. Past Medical History:          Diagnosis Date    [address] [address]     OCD (obsessive compulsive disorder)     Osteopenia     Syncopal episodes     Thyroid disease        Past Surgical History:          Procedure Laterality Date     SECTION      COLONOSCOPY      COLONOSCOPY N/A 2020    COLONOSCOPY DIAGNOSTIC performed by Merry Rojas MD at [facility]         Medications Prior to Admission:      Prior to Admission medications    Medication Sig Start Date End Date Taking? Authorizing Provider   levothyroxine (SYNTHROID) 125 MCG tablet Take 125 mcg by mouth Daily    Historical Provider, MD   fluvoxaMINE (LUVOX) 100 MG tablet Take 100 mg by mouth 2 times daily    Historical Provider, MD   natalizumab (TYSABRI) 300 MG/15ML injection Infuse intravenously every 28 days    Historical Provider, MD   metoprolol succinate (TOPROL XL) 50 MG extended release tablet Take 50 mg by mouth nightly    Historical Provider, MD   clonazePAM (KLONOPIN) 0.5 MG tablet Take 0.5 mg by mouth 2 times daily as needed.     Historical Provider, MD   calcium citrate (CALCITRATE) 950 MG tablet Take 1 tablet by mouth 2 times daily     Historical Provider, MD   Cholecalciferol (VITAMIN D) 2000 units CAPS capsule Take 4,000 capsules by mouth daily    Historical Provider, MD   clonazePAM (KLONOPIN) 1 MG tablet  3/13/15   Historical Provider, MD   docusate sodium (COLACE) 100 MG capsule Take 1 capsule by mouth 2 times daily. 4/4/15   Isaac Thomas PA-C       Allergies:  Patient has no known allergies. Social History:      TOBACCO:   reports that she has quit smoking. She has never used smokeless tobacco.  ETOH:   reports no history of alcohol use. Family History:       Reviewed in detail and non contributory          Problem Relation Age of Onset    Stroke Mother     Other Father         omar       REVIEW OF SYSTEMS:   Pertinent positives as noted in the HPI. All other systems reviewed and negative. PHYSICAL EXAM PERFORMED:    BP (!) 120/45   Pulse 124   Temp 99.7 °F (37.6 °C) (Oral)   Resp 22   Ht 5' 5\" (1.651 m)   Wt 158 lb (71.7 kg)   SpO2 91%   BMI 26.29 kg/m²     General appearance:  No apparent distress, cooperative. HEENT:  Normal cephalic, atraumatic without obvious deformity. Conjunctivae/corneas clear. Neck: Supple, with full range of motion. No cervical lymphadenopathy  Respiratory:  Normal respiratory effort. Clear to auscultation, bilaterally without Rales/Wheezes/Rhonchi. Cardiovascular:  Regular rate and rhythm with normal S1/S2 without murmurs, rubs or gallops. Abdomen: Soft, non-tender, non-distended, normal bowel sounds. Musculoskeletal:  No edema noted bilaterally. No tenderness on palpation   Skin: no rash visible  Neurologic:  Neurologically intact without any focal sensory/motor deficits.   grossly non-focal.  Psychiatric:  Alert and oriented, normal mood  Peripheral Pulses: +2 palpable, equal bilaterally       Labs:     Recent Labs     07/04/21  1628   WBC 9.7   HGB 12.6   HCT 36.2        Recent Labs     07/04/21  1628      K 4.3   CL 98*   CO2 28   BUN 21*   CREATININE 1.0   CALCIUM 9.1     Recent Labs     07/04/21  1628   AST 19   ALT 14   BILITOT 0.8   ALKPHOS 104 No results for input(s): INR in the last 72 hours. Recent Labs     07/04/21  1628   TROPONINI <0.01       Urinalysis:      Lab Results   Component Value Date    NITRU Negative 07/04/2021    WBCUA 509 07/04/2021    BACTERIA 4+ 07/04/2021    RBCUA 8 07/04/2021    BLOODU SMALL 07/04/2021    SPECGRAV 1.016 07/04/2021    GLUCOSEU Negative 07/04/2021       Radiology:       XR CHEST PORTABLE   Final Result   No acute disease. Increased markings are seen at the lung bases, likely due   to low lung volumes rather than pneumonia                 Active Hospital Problems    Diagnosis Date Noted    Sepsis [facility]) [A41.9] 07/04/2021         Patient is a 67-year-old female with past medical history of multiple sclerosis, hypothyroidism who presents to the hospital for chills at home. According to the patient for the past one night she has been feeling chills, also had subjective fevers. Patient has urinary frequency, patient recently had Botox procedure of her bladder with instrumentation. Patient has mild cough, not sure about phlegm color and not bringing up much phlegm. Patient otherwise denies diarrhea constipation. Assessment  Urinary frequency suggestive of urinary tract infection  Sepsis present on admission  Suspect bilateral lower lung pneumonia in the setting of multiple sclerosis, likely gram-positive pneumonia  Multiple sclerosis  Hypothyroidism    Plan  We will order vancomycin, cefepime, check blood cultures, urine culture, procalcitonin level  Resume home medications  DVT prophylaxis of Lovenox  Diet: No diet orders on file  Code Status: No Order    PT/OT Eval Status: ordered    Dispo - pending clinical improvement       Myranda Tabares MD    The note was completed using EMR and Dragon dictation system. Every effort was made to ensure accuracy; however, inadvertent computerized transcription errors may be present. Thank you Hermilo Hernandes MD for the opportunity to be involved in this patient's care.  If you have any questions or concerns please feel free to contact me at [phone].     Seamus Tucekr MD

## 2021-07-04 NOTE — ED NOTES
ED SBAR report provider to [facility]. Patient to be transported to Room 5270 via stretcher by transport tech. Patient transported with bedside cardiac monitor and with IV medications infusing. IV site clean, dry, and intact. MEWS score and pain assessed as 0 and documented. Updated patient on plan of care.        Maykel Dallas RN  07/04/21 0525

## 2021-07-04 NOTE — PROGRESS NOTES
Medication Reconciliation     List of medications patient is currently taking is complete. Source of information:   1. Conversation with patient at bedside  2. EPIC records        Notes regarding home medications:  1. Patient received all of her AM home medications today PTA. 2. Added to patient's med list:  ASA EC 81mg QD  Fish Oil 1000mg QAM  Glycolax QAM  3. Patient stated she takes 1.5mg of Klonopin QPM to help her sleep. Denies any other OTC/herbal medications.     Yannick Kerns, Pharmacy Intern

## 2021-07-04 NOTE — ED PROVIDER NOTES
[address]  [address] [address]  Dept: [phone]  Loc: [phone]  eMERGENCYdEPARTMENT eNCOUnter      Pt Name: Collette Stairs  MRN: [id number]  Angelagframakrishna 1950  Date of evaluation: 7/4/2021  Provider:Sanjuana Hernandez PA-C    CHIEF COMPLAINT       Chief Complaint   Patient presents with    Fever     Arrived by [facility] withc/o fever and chills since last night. CRITICAL CARE TIME   Total Critical Care time was 25 minutes, excluding separately reportable procedures. There was a high probability of clinically significant/life threatening deterioration in the patient's condition which required my urgentintervention. HISTORY OF PRESENT ILLNESS  (Location/Symptom, Timing/Onset, Context/Setting, Quality, Duration,Modifying Factors, Severity.)   Collette Stairs is a 69 y.o. female with past medical history of MS, hypothyroidism who presents to the emergency department by EMS for fever, chills, body aches, slight cough, generalized ill feeling postnasal drainage. Patient states all symptoms began last night. Pain from body aches rated as 8/10 severity. She has no known sick contacts. Patient fully vaccinated against COVID-19. Patient denies any urinary symptoms, flank pain. She does self cath. She is on monthly natalizumab infusion. Nursing Notes were reviewedand agreed with or any disagreements were addressed in the HPI. REVIEW OF SYSTEMS    (2-9 systems for level 4, 10 or more for level 5)     Review of Systems   Constitutional: Positive for chills, fatigue and fever. Respiratory: Positive for cough. Negative for chest tightness and shortness of breath. Cardiovascular: Negative. Gastrointestinal: Positive for nausea. Negative for abdominal pain and vomiting. Genitourinary: Negative. Musculoskeletal: Positive for arthralgias and myalgias. Skin: Negative.     Neurological: Negative for dizziness and light-headedness. Psychiatric/Behavioral: Negative for behavioral problems and confusion. Except as noted above the remainder of the review of systems was reviewed and negative. PAST MEDICAL HISTORY         Diagnosis Date    Multiple sclerosis ([address]     OCD (obsessive compulsive disorder)     Osteopenia     Syncopal episodes     Thyroid disease        SURGICAL HISTORY           Procedure Laterality Date     SECTION      COLONOSCOPY      COLONOSCOPY N/A 2020    COLONOSCOPY DIAGNOSTIC performed by Uvaldo Butler MD at [facility]     [unfilled]    ALLERGIES     Patient has no known allergies. FAMILY HISTORY           Problem Relation Age of Onset    Stroke Mother     Other Father         omar     Family Status   Relation Name Status    Mother  Alive    Father          SOCIAL HISTORY      reports that she has quit smoking. She has never used smokeless tobacco. She reports that she does not drink alcohol and does not use drugs. PHYSICAL EXAM    (up to 7 for level 4, 8 or more for level 5)     ED Triage Vitals [21 1621]   Enc Vitals Group      BP (!) 143/66      Pulse 132      Resp 20      Temp 99.7 °F (37.6 °C)      Temp Source Oral      SpO2 94 %      Weight (!) 365 lb 4.8 oz (165.7 kg)      Height       Head Circumference       Peak Flow       Pain Score       Pain Loc       Pain Edu? Excl. in [address]? Physical Exam  Constitutional:       Appearance: She is ill-appearing. HENT:      Head: Normocephalic and atraumatic. Cardiovascular:      Rate and Rhythm: Regular rhythm. Tachycardia present. Pulmonary:      Effort: Pulmonary effort is normal. No respiratory distress. Breath sounds: Normal breath sounds. Abdominal:      Palpations: Abdomen is soft. Tenderness: There is no abdominal tenderness. There is no guarding or rebound. Musculoskeletal:         General: Normal range of motion. Cervical back: Normal range of motion and neck supple. Skin:     General: Skin is warm. Neurological:      General: No focal deficit present. Mental Status: She is alert and oriented to person, place, and time. Psychiatric:         Mood and Affect: Mood normal.         Behavior: Behavior normal.           DIAGNOSTIC RESULTS     EKG: All EKG's are interpreted by the Emergency Department Physician who either signs or Co-signs this chart in the absence of a cardiologist.    RADIOLOGY:   Non-plain film images such as CT, Ultrasound and MRI are read by the radiologist. Plain radiographic images are visualized and preliminarilyinterpreted by the emergency physician with the below findings:    Interpretation per the Radiologist below,if available at the time of this note:    XR CHEST PORTABLE   Final Result   No acute disease.   Increased markings are seen at the lung bases, likely due   to low lung volumes rather than pneumonia               LABS:  Labs Reviewed   CBC WITH AUTO DIFFERENTIAL - Abnormal; Notable for the following components:       Result Value    RBC 3.74 (*)     Neutrophils Absolute 8.0 (*)     All other components within normal limits    Narrative:     Performed at:  [address] [address]   Phone [phone]   COMPREHENSIVE METABOLIC PANEL - Abnormal; Notable for the following components:    Chloride 98 (*)     Glucose 142 (*)     BUN 21 (*)     GFR Non- 55 (*)     All other components within normal limits    Narrative:     Performed at:  [address] [address]   Phone [phone]   LACTIC ACID, PLASMA - Abnormal; Notable for the following components:    Lactic Acid 2.2 (*)     All other components within normal limits    Narrative:     Performed at:  [facility]  [address] [place], [place] [address]   Phone [phone]   URINE RT REFLEX TO CULTURE - Abnormal; Notable for the following components:    Clarity, UA TURBID (*)     Blood, Urine SMALL (*)     Protein, UA 30 (*)     Leukocyte Esterase, Urine LARGE (*)     All other components within normal limits    Narrative:     Performed at:  [facility]  [address] [address]   Phone [phone]   MICROSCOPIC URINALYSIS - Abnormal; Notable for the following components:    Bacteria, UA 4+ (*)     WBC,  (*)     RBC, UA 8 (*)     All other components within normal limits    Narrative:     Performed at:  [facility]  [address] [place], [place] [address]   Phone [phone] [phone]    Narrative:     Performed at:  [facility]  [address] [facility] [address]   Phone [phone]   CULTURE, BLOOD 1   CULTURE, BLOOD 2   CULTURE, URINE   TROPONIN    Narrative:     Performed at:  [facility]  [address] [place], [place] [address]   Phone [phone]   LACTIC ACID, PLASMA       All other labs were within normal range or not returned as of this dictation. EMERGENCY DEPARTMENT COURSE and DIFFERENTIAL DIAGNOSIS/MDM:   Vitals:    Vitals:    07/04/21 1731 07/04/21 1739 07/04/21 1746 07/04/21 1801   BP: (!) 127/48 (!) 127/48 (!) 128/46 (!) 120/45   Pulse: 123 121 121 124   Resp: 22 21 22 22   Temp:       TempSrc:       SpO2: 93% 94% 94% 91%   Weight:       Height:           MDM     Patient presents ED with HPI noted above. On arrival temperature 99.7. Patient is tachycardic with a pulse of 132. She is tachypneic with a respiratory rate of 24. She is mildly hypoxic with oxygen saturations in the low 90s on arrival.  Systolic blood pressure normal however diastolic pressure low in the 40s/50s.      Patient does meet SIRS criteria with heart rate, tachypnea. Septic work-up initiated. Blood cultures pending. Lactic acid initially mildly elevated 2.2. Patient given IV fluid in the ED. She has no leukocytosis. Urine showed evidence of infection. Chest x-ray increased lung markings in bases, this was favored to be due to low lung volumes over pneumonia. Clinical concern for pneumonia as well given symptoms of mild hypoxia. Patient covered with cefepime. Patient had persistent tachycardia after 1 L IV fluid in the ED. She was given an additional liter of fluid. Remainder vitals as above. H&H normal.  CMP showed no significant electrolyte abnormality. Creatinine 1.0, GFR 55. No hepatic impairment. Rapid Covid negative. Patient septic secondary to UTI, possible pneumonia. Consultation made to hospitalist.  Dr. Gina Bonilla kindly admitted patient. CONSULTS:  IP CONSULT TO PHARMACY    PROCEDURES:  Procedures    FINAL IMPRESSION      1. Septicemia ([address]    2. Acute cystitis without hematuria    3. Pneumonia due to infectious organism, unspecified laterality, unspecified part of lung          DISPOSITION/PLAN   [unfilled]    PATIENT REFERRED TO:  No follow-up provider specified.     DISCHARGE MEDICATIONS:  New Prescriptions    No medications on file       (Please note that portions of this note were completed with a voice recognition program.  Efforts were made to edit the dictations but occasionally words are mis-transcribed.)    [address] [facility]          [address] [place], [place]  07/04/21 2905

## 2021-07-04 NOTE — ED NOTES
Bed: E-42  Expected date: 7/4/21  Expected time: 4:10 PM  Means of arrival: [facility]  Comments:  70F chills, nausea, headache     William Cueto RN  07/04/21 2309

## 2021-07-05 LAB
ANION GAP SERPL CALCULATED.3IONS-SCNC: 8 MMOL/L (ref 3–16)
BUN BLDV-MCNC: 18 MG/DL (ref 7–20)
CALCIUM SERPL-MCNC: 8.2 MG/DL (ref 8.3–10.6)
CHLORIDE BLD-SCNC: 111 MMOL/L (ref 99–110)
CO2: 24 MMOL/L (ref 21–32)
CREAT SERPL-MCNC: 0.9 MG/DL (ref 0.6–1.2)
EKG ATRIAL RATE: 145 BPM
EKG DIAGNOSIS: NORMAL
EKG P AXIS: 61 DEGREES
EKG P-R INTERVAL: 148 MS
EKG Q-T INTERVAL: 264 MS
EKG QRS DURATION: 76 MS
EKG QTC CALCULATION (BAZETT): 410 MS
EKG R AXIS: 119 DEGREES
EKG T AXIS: 60 DEGREES
EKG VENTRICULAR RATE: 145 BPM
GFR AFRICAN AMERICAN: >60
GFR NON-AFRICAN AMERICAN: >60
GLUCOSE BLD-MCNC: 121 MG/DL (ref 70–99)
HCT VFR BLD CALC: 32.8 % (ref 36–48)
HEMOGLOBIN: 11.6 G/DL (ref 12–16)
MCH RBC QN AUTO: 33.9 PG (ref 26–34)
MCHC RBC AUTO-ENTMCNC: 35.2 G/DL (ref 31–36)
MCV RBC AUTO: 96.4 FL (ref 80–100)
PDW BLD-RTO: 12.8 % (ref 12.4–15.4)
PLATELET # BLD: 108 K/UL (ref 135–450)
PMV BLD AUTO: 8.5 FL (ref 5–10.5)
POTASSIUM REFLEX MAGNESIUM: 3.9 MMOL/L (ref 3.5–5.1)
RBC # BLD: 3.4 M/UL (ref 4–5.2)
SODIUM BLD-SCNC: 143 MMOL/L (ref 136–145)
STREP PNEUMONIAE ANTIGEN, URINE: NORMAL
VANCOMYCIN RANDOM: 11.6 UG/ML
WBC # BLD: 9.8 K/UL (ref 4–11)

## 2021-07-05 PROCEDURE — 94760 N-INVAS EAR/PLS OXIMETRY 1: CPT

## 2021-07-05 PROCEDURE — 80048 BASIC METABOLIC PNL TOTAL CA: CPT

## 2021-07-05 PROCEDURE — 97162 PT EVAL MOD COMPLEX 30 MIN: CPT

## 2021-07-05 PROCEDURE — 80202 ASSAY OF VANCOMYCIN: CPT

## 2021-07-05 PROCEDURE — 6370000000 HC RX 637 (ALT 250 FOR IP): Performed by: INTERNAL MEDICINE

## 2021-07-05 PROCEDURE — 97116 GAIT TRAINING THERAPY: CPT

## 2021-07-05 PROCEDURE — 85027 COMPLETE CBC AUTOMATED: CPT

## 2021-07-05 PROCEDURE — 97166 OT EVAL MOD COMPLEX 45 MIN: CPT

## 2021-07-05 PROCEDURE — 6360000002 HC RX W HCPCS: Performed by: INTERNAL MEDICINE

## 2021-07-05 PROCEDURE — 97530 THERAPEUTIC ACTIVITIES: CPT

## 2021-07-05 PROCEDURE — 93010 ELECTROCARDIOGRAM REPORT: CPT | Performed by: INTERNAL MEDICINE

## 2021-07-05 PROCEDURE — 87641 MR-STAPH DNA AMP PROBE: CPT

## 2021-07-05 PROCEDURE — 36415 COLL VENOUS BLD VENIPUNCTURE: CPT

## 2021-07-05 PROCEDURE — 2580000003 HC RX 258: Performed by: PHYSICIAN ASSISTANT

## 2021-07-05 PROCEDURE — 2580000003 HC RX 258: Performed by: INTERNAL MEDICINE

## 2021-07-05 PROCEDURE — 97535 SELF CARE MNGMENT TRAINING: CPT

## 2021-07-05 PROCEDURE — 2060000000 HC ICU INTERMEDIATE R&B

## 2021-07-05 PROCEDURE — 6360000002 HC RX W HCPCS: Performed by: PHYSICIAN ASSISTANT

## 2021-07-05 RX ADMIN — METOPROLOL SUCCINATE 50 MG: 50 TABLET, EXTENDED RELEASE ORAL at 20:31

## 2021-07-05 RX ADMIN — FLUVOXAMINE MALEATE 100 MG: 50 TABLET, COATED ORAL at 10:38

## 2021-07-05 RX ADMIN — SODIUM CHLORIDE: 9 INJECTION, SOLUTION INTRAVENOUS at 04:59

## 2021-07-05 RX ADMIN — Medication 10 ML: at 20:32

## 2021-07-05 RX ADMIN — ACETAMINOPHEN 650 MG: 325 TABLET ORAL at 09:22

## 2021-07-05 RX ADMIN — ACETAMINOPHEN 650 MG: 325 TABLET ORAL at 18:17

## 2021-07-05 RX ADMIN — ENOXAPARIN SODIUM 40 MG: 40 INJECTION SUBCUTANEOUS at 08:28

## 2021-07-05 RX ADMIN — CEFEPIME HYDROCHLORIDE 2000 MG: 2 INJECTION, POWDER, FOR SOLUTION INTRAVENOUS at 17:17

## 2021-07-05 RX ADMIN — LEVOTHYROXINE SODIUM 125 MCG: 0.03 TABLET ORAL at 05:04

## 2021-07-05 RX ADMIN — VANCOMYCIN HYDROCHLORIDE 1000 MG: 1 INJECTION, POWDER, LYOPHILIZED, FOR SOLUTION INTRAVENOUS at 08:28

## 2021-07-05 RX ADMIN — CEFEPIME HYDROCHLORIDE 2000 MG: 2 INJECTION, POWDER, FOR SOLUTION INTRAVENOUS at 05:04

## 2021-07-05 RX ADMIN — FLUVOXAMINE MALEATE 100 MG: 50 TABLET, COATED ORAL at 20:31

## 2021-07-05 RX ADMIN — SODIUM CHLORIDE: 9 INJECTION, SOLUTION INTRAVENOUS at 20:32

## 2021-07-05 ASSESSMENT — PAIN DESCRIPTION - DESCRIPTORS
DESCRIPTORS: HEADACHE
DESCRIPTORS: HEADACHE

## 2021-07-05 ASSESSMENT — PAIN SCALES - GENERAL
PAINLEVEL_OUTOF10: 3
PAINLEVEL_OUTOF10: 0
PAINLEVEL_OUTOF10: 0
PAINLEVEL_OUTOF10: 8
PAINLEVEL_OUTOF10: 0
PAINLEVEL_OUTOF10: 6
PAINLEVEL_OUTOF10: 0
PAINLEVEL_OUTOF10: 4

## 2021-07-05 ASSESSMENT — PAIN DESCRIPTION - LOCATION
LOCATION: HEAD
LOCATION: HEAD

## 2021-07-05 ASSESSMENT — PAIN DESCRIPTION - FREQUENCY
FREQUENCY: INTERMITTENT
FREQUENCY: INTERMITTENT

## 2021-07-05 ASSESSMENT — PAIN DESCRIPTION - PAIN TYPE
TYPE: ACUTE PAIN
TYPE: ACUTE PAIN

## 2021-07-05 ASSESSMENT — PAIN DESCRIPTION - ORIENTATION
ORIENTATION: MID
ORIENTATION: MID

## 2021-07-05 ASSESSMENT — PAIN DESCRIPTION - ONSET
ONSET: ON-GOING
ONSET: ON-GOING

## 2021-07-05 ASSESSMENT — PAIN DESCRIPTION - PROGRESSION
CLINICAL_PROGRESSION: NOT CHANGED
CLINICAL_PROGRESSION: NOT CHANGED

## 2021-07-05 NOTE — PROGRESS NOTES
Physical Therapy    Facility/Department: [address] PROGRESSIVE CARE  Initial Assessment    NAME: Treva Lechuga  : 1950  MRN: [id number]    Date of Service: 2021    Discharge Recommendations:  Continue to assess pending progress   PT Equipment Recommendations  Other:  Lazarus Midaubrey. Assessment   Body structures, Functions, Activity limitations: Decreased functional mobility ; Decreased strength;Decreased balance  Assessment: 68 y/o female admit 2021 with Acute Cystitis, Pneumonia, Septicemia. PMH as noted including MS, Osteopenia, Syncopal Episodes, OCD. PTA pt living with  in house with few steps to enter and 2nd floor bed/bath. Pt reports adequate assist/support upon d/c. Do not anticipate need cont PT Services upon d/c; however mobility improved with use of Walker (rather than cane, used pta). Pt/ appear receptive to Josefa Ply upon d/c. Will monitor pt's progress. Prognosis: Good  Decision Making: Low Complexity  History: 68 y/o female admit 2021 with Acute Cystitis, Pneumonia, Septicemia. PMH as noted including MS, Osteopenia, Syncopal Episodes, OCD. Exam: See above. Clinical Presentation: See above. Patient Education: Role of PT, POC, Need to call for assist, Safe use fo Josefa Ply. Barriers to Learning: None. REQUIRES PT FOLLOW UP: Yes  Activity Tolerance  Activity Tolerance: Patient Tolerated treatment well       Patient Diagnosis(es): The primary encounter diagnosis was Septicemia ([address] Diagnoses of Acute cystitis without hematuria and Pneumonia due to infectious organism, unspecified laterality, unspecified part of lung were also pertinent to this visit. has a past medical history of Multiple sclerosis ([address] OCD (obsessive compulsive disorder), Osteopenia, Syncopal episodes, and Thyroid disease. has a past surgical history that includes  section;  Tonsillectomy; Colonoscopy; sinus surgery; and Colonoscopy (N/A, 8/26/2020). Restrictions  Restrictions/Precautions  Restrictions/Precautions: Fall Risk, Up as Tolerated  Vision/Hearing  Vision: Impaired  Vision Exceptions: Wears glasses at all times  Hearing: Within functional limits     Subjective  General  Chart Reviewed: Yes  Patient assessed for rehabilitation services?: Yes  Additional Pertinent Hx: 70 y/o female admit 7/4/2021 with Acute Cystitis, Pneumonia, Septicemia. PMH as noted including MS, Osteopenia, Syncopal Episodes, OCD. Family / Caregiver Present: Yes ( did arrive during PT Eval.)  Referring Practitioner: Dr. Cally Walden  Diagnosis: Acute Cystitis, Pneumonia, Septicemia. Follows Commands: Within Functional Limits  Subjective  Subjective: Pt agreeable to PT Eval/Rx. Pain Screening  Patient Currently in Pain: Denies          Orientation  Orientation  Overall Orientation Status: Within Functional Limits  Social/Functional History  Social/Functional History  Lives With: Spouse (Ana Croft)  Type of Home: House  Home Layout: Bed/Bath upstairs, 1/2 bath on main level (laundry upstairs with bed/bath)  Home Access: Stairs to enter without rails  Entrance Stairs - Number of Steps: 3 LEONARD; 7 + 8 steps with argelia rails up to bed/bath  Bathroom Shower/Tub: Walk-in shower, Tub/Shower unit (Pt uses Walk-In Shower.)  Bathroom Toilet: Standard  Bathroom Equipment: Built-in shower seat  Bathroom Accessibility: Accessible  Home Equipment: Cane  ADL Assistance: Independent  Homemaking Assistance: Independent (Shared with .)  Ambulation Assistance: Independent (Without assist device at home; uses cane when going out.)  Transfer Assistance: Independent  Active : Yes ( usually drives.)  Occupation: Retired  Additional Comments:  reports pt with gradual decrease overall strength/endurance with functional activities.   Cognition   Cognition  Overall Cognitive Status: WFL    Objective          AROM RLE (degrees)  RLE AROM: WFL  AROM LLE (degrees)  LLE AROM : WFL  AROM RUE (degrees)  RUE AROM : WFL  AROM LUE (degrees)  LUE AROM : WFL  Strength RLE  Comment: Grossly 4- 4/5; weak with functional activities. Strength LLE  Comment: Grossly 4- 4/5; weak with functional activities. Bed mobility  Supine to Sit: Stand by assistance (HOB elevated. Use of Bedrail.)  Transfers  Sit to Stand: Stand by assistance  Stand to sit: Stand by assistance  Ambulation  Ambulation?: Yes  More Ambulation?: Yes  Ambulation 1  Surface: level tile  Distance: Pt amb to bathroom with Cane CGA. Diminished step length/clearance; guarded. Noting mild knee buckling/giving way x 1 although able to recover. Ambulation 2  Surface - 2: level tile  Device 2: Rolling Walker  Distance: Pt amb bathroom to chair (~20'), additional 50' with Rolling Walker SBA. Improve LE step length/clearance; no LE buckling/giving way. Appears less guarded. Plan   Plan  Times per week: 3-5x week while in acute care setting. Current Treatment Recommendations: Strengthening, Functional Mobility Training, Transfer Training, Gait Training, Safety Education & Training, Patient/Caregiver Education & Training  Safety Devices  Type of devices: Call light within reach, Chair alarm in place, Left in chair, Nurse notified      AM-PAC Score  AM-PAC Inpatient Mobility Raw Score : 18 (07/05/21 1135)  AM-PAC Inpatient T-Scale Score : 43.63 (07/05/21 1135)  Mobility Inpatient CMS 0-100% Score: 46.58 (07/05/21 1135)  Mobility Inpatient CMS G-Code Modifier : CK (07/05/21 1135)          Goals  Short term goals  Time Frame for Short term goals: Upon d/c acute care setting. Short term goal 1: Bed Mob Independent. Short term goal 2: Transfers with assist device Supervision. Short term goal 3: Amb with assist device (walker vs cane) ' SBA/Supervision. Patient Goals   Patient goals : Return home with .        Therapy Time   Individual Concurrent Group Co-treatment   Time In 0802         Time Out 0478 Minutes [facility]

## 2021-07-05 NOTE — PROGRESS NOTES
Clinical Pharmacy Note  Vancomycin Consult    Komal Leary is a 69 y.o. female ordered Vancomycin for pneumonia (CAP); consult received from Dr. John Thomas to manage therapy. Also receiving cefepime. Patient Active Problem List   Diagnosis    Senile osteoporosis    Sepsis ([address]       Allergies:  Patient has no known allergies. Temp max:  Temp (24hrs), Av.8 °F (37.1 °C), Min:98.2 °F (36.8 °C), Max:99.7 °F (37.6 °C)      Recent Labs     21  1628 21  0522   WBC 9.7 9.8       Recent Labs     21  1628 21  0522   BUN 21* 18   CREATININE 1.0 0.9         Intake/Output Summary (Last 24 hours) at 2021 0727  Last data filed at 2021 0500  Gross per 24 hour   Intake 1151.25 ml   Output 400 ml   Net 751.25 ml       Culture Results:  Pending      Ht Readings from Last 1 Encounters:   21 5' 5\" (1.651 m)        Wt Readings from Last 1 Encounters:   21 162 lb 11.2 oz (73.8 kg)         Estimated Creatinine Clearance: 58 mL/min (based on SCr of 0.9 mg/dL). Assessment/Plan:  Vancomycin level this morning was 11.6 mg/L this morning after receiving Vancomycin 1250 mg. Vancomycin 1000 mg every 18 hours ordered. Goal trough level of 15-20 mg/L. Level ordered for 21 at 2000. Thank you for the consult.    Davina Almaguer, [facility], PharmD, 2021 7:28 AM

## 2021-07-05 NOTE — PROGRESS NOTES
Pt to Rm#[id number] via ED stretcher with all personal belongings. Oriented to room and role as RN. Telemonitor #1 on and verified by Debbi. Pt transfered to bed, alert and oriented x4. Assessment of pt in progress. POC and education initiated per protocol. Call light within reach. Bed in lowest position and wheels locked. Room is free of clutter. Personal belongings within reach. No current complaints at this time. Will continue to monitor.

## 2021-07-05 NOTE — PLAN OF CARE
Problem: Falls - Risk of:  Goal: Will remain free from falls  Description: Will remain free from falls  Outcome: Ongoing  Goal: Absence of physical injury  Description: Absence of physical injury  Outcome: Ongoing     Problem: Discharge Planning:  Goal: Discharged to appropriate level of care  Description: Discharged to appropriate level of care  Outcome: Ongoing     Problem: Gas Exchange - Impaired:  Goal: Levels of oxygenation will improve  Description: Levels of oxygenation will improve  Outcome: Ongoing     Problem: Infection, Septic Shock:  Goal: Will show no infection signs and symptoms  Description: Will show no infection signs and symptoms  Outcome: Ongoing     Problem: Infection - Ventilator-Associated Pneumonia:  Goal: Absence of pulmonary infection  Description: Absence of pulmonary infection  Outcome: Ongoing     Problem: Serum Glucose Level - Abnormal:  Goal: Ability to maintain appropriate glucose levels will improve  Description: Ability to maintain appropriate glucose levels will improve  Outcome: Ongoing     Problem: Tissue Perfusion, Altered:  Goal: Circulatory function within specified parameters  Description: Circulatory function within specified parameters  Outcome: Ongoing     Problem: Venous Thromboembolism:  Goal: Will show no signs or symptoms of venous thromboembolism  Description: Will show no signs or symptoms of venous thromboembolism  Outcome: Ongoing  Goal: Absence of signs or symptoms of impaired coagulation  Description: Absence of signs or symptoms of impaired coagulation  Outcome: Ongoing     Problem: Sensory:  Goal: General experience of comfort will improve  Description: General experience of comfort will improve  Outcome: Ongoing     Problem: Urinary Elimination:  Goal: Signs and symptoms of infection will decrease  Description: Signs and symptoms of infection will decrease  Outcome: Ongoing  Goal: Ability to reestablish a normal urinary elimination pattern will improve - after catheter removal  Description: Ability to reestablish a normal urinary elimination pattern will improve  Outcome: Ongoing  Goal: Complications related to the disease process, condition or treatment will be avoided or minimized  Description: Complications related to the disease process, condition or treatment will be avoided or minimized  Outcome: Ongoing

## 2021-07-05 NOTE — PROGRESS NOTES
Pt reports that she straight cath's herself at home three times daily. On call NP notified of this with request for bladder scan/straight cath orders TID. Pt also with soft BP at this time; currently 104/53 (MAP 70). Pt to receive ordered 500mL NS bolus and subsequent NS IVF at 75mL/hr. However, pt also has scheduled toprol xl 50mg PO due at this time. On call NP notified. New orders placed for bladder scans TID, PRN straight caths for bladder scan 250mL or greater, and to hold scheduled toprol xl 50mg PO tonight.

## 2021-07-05 NOTE — ACP (ADVANCE CARE PLANNING)
Advance Care Planning     Advance Care Planning Activator (Inpatient)  Conversation Note      Date of ACP Conversation: 7/5/2021     Conversation Conducted with: Patient with Decision Making Capacity    ACP Activator: YESSICA Carranza    Health Care Decision Maker:     Current Designated Health Care Decision Maker:     Primary Decision Maker: Ana Cristina Castillo - [phone]    Care Preferences    Ventilation: \"If you were in your present state of health and suddenly became very ill and were unable to breathe on your own, what would your preference be about the use of a ventilator (breathing machine) if it were available to you? \"      Would the patient desire the use of ventilator (breathing machine)?: yes    \"If your health worsens and it becomes clear that your chance of recovery is unlikely, what would your preference be about the use of a ventilator (breathing machine) if it were available to you? \"     Would the patient desire the use of ventilator (breathing machine)?: Yes      Resuscitation  \"CPR works best to restart the heart when there is a sudden event, like a heart attack, in someone who is otherwise healthy. Unfortunately, CPR does not typically restart the heart for people who have serious health conditions or who are very sick. \"    \"In the event your heart stopped as a result of an underlying serious health condition, would you want attempts to be made to restart your heart (answer \"yes\" for attempt to resuscitate) or would you prefer a natural death (answer \"no\" for do not attempt to resuscitate)? \" yes       [] Yes   [] No   Educated Patient / Ginger Pottso regarding differences between Advance Directives and portable DNR orders.     Length of ACP Conversation in minutes:  2    Conversation Outcomes:  [x] ACP discussion completed  [] Existing advance directive reviewed with patient; no changes to patient's previously recorded wishes  [] New Advance Directive completed  [] Portable Do Not Rescitate prepared for Provider review and signature  [] POLST/POST/MOLST/MOST prepared for Provider review and signature      Follow-up plan:    [] Schedule follow-up conversation to continue planning  [] Referred individual to Provider for additional questions/concerns   [] Advised patient/agent/surrogate to review completed ACP document and update if needed with changes in condition, patient preferences or care setting    [x] This note routed to one or more involved healthcare providers Duane Hess, MD primary care physician. Respectfully submitted,    TASHA Cerda-S  [facility]   [phone]    Electronically signed by YESSICA Oliveira on 7/5/2021 at 1:03 PM

## 2021-07-05 NOTE — PROGRESS NOTES
Pt bladder scanned per orders for a volume of 286mL. Pt straight cathed at this time using sterile technique and 14F catheter. Total volume of urine returned from straight cath was 400mL. Urine specimen collected and sent to lab for strep pneumoniae antigen. Will continue to bladder scan and straight cath PRN per orders.

## 2021-07-05 NOTE — PROGRESS NOTES
4 Eyes Skin Assessment     NAME:  Komal Leary  YOB: 1950  MEDICAL RECORD NUMBER:  [id number]    The patient is being assess for  Admission    I agree that 2 RN's have performed a thorough Head to Toe Skin Assessment on the patient. ALL assessment sites listed below have been assessed. Areas assessed by both nurses:    Head, Face, Ears, Shoulders, Back, Chest, Arms, Elbows, Hands, Sacrum. Buttock, Coccyx, Ischium and Legs. Feet and Heels        Does the Patient have a Wound?  No noted wound(s)       Tye Prevention initiated:  No   Wound Care Orders initiated:  No    Pressure Injury (Stage 3,4, Unstageable, DTI, NWPT, and Complex wounds) if present place consult order under [de-identified] No    New and Established Ostomies if present place consult order under : No      Nurse 1 eSignature: Electronically signed by Ivette Sacks, RN on 7/4/21 at 11:01 PM EDT    **SHARE this note so that the co-signing nurse is able to place an eSignature**    Nurse 2 eSignature: Electronically signed by Josr Skaggs RN on 7/4/21 at 11:04 PM EDT

## 2021-07-05 NOTE — PROGRESS NOTES
Pt taken to bathroom and returned back to bed; pt tolerated this well as a standby assist with a cane. Call received from Debbi at this time stating pt's HR was up to the 140's in [address] [place] [address] . Once pt was returned back to bed, HR returned to lower rate in the 110's. Pt has been ST since admission with HR between 100-110's. Will continue to monitor on telemetry.

## 2021-07-05 NOTE — PROGRESS NOTES
Patient has  Progress Note  Admit Date: 7/4/2021      PCP: Manuel Huynh MD     CC: F/U for fever     Days in hospital:  1    SUBJECTIVE / Interval History:  Patient has a dry hacking cough. Feels okay    Needing to straight cath herself  Allergies  Patient has no known allergies. Medications    Scheduled Meds:   vancomycin  1,000 mg Intravenous Q18H    cefepime  2,000 mg Intravenous Q12H    sodium chloride flush  10 mL Intravenous 2 times per day    enoxaparin  40 mg Subcutaneous Daily    fluvoxaMINE  100 mg Oral BID    levothyroxine  125 mcg Oral Daily    metoprolol succinate  50 mg Oral Nightly    vancomycin (VANCOCIN) intermittent dosing (placeholder)   Other RX Placeholder     Continuous Infusions:   sodium chloride      sodium chloride 75 mL/hr at 07/05/21 0459       PRN Meds:  sodium chloride flush, sodium chloride, potassium chloride, magnesium sulfate, ondansetron **OR** ondansetron, magnesium hydroxide, acetaminophen **OR** acetaminophen, clonazePAM    Vitals    /70   Pulse 109   Temp 98.3 °F (36.8 °C) (Oral)   Resp 18   Ht 5' 5\" (1.651 m)   Wt 162 lb 11.2 oz (73.8 kg)   SpO2 93%   BMI 27.07 kg/m²     Exam:    Gen: No distress. Eyes: PERRL. No sclera icterus. No conjunctival injection. ENT: No discharge. Pharynx clear. External appearance of ears and nose normal.  Neck: Trachea midline. No obvious mass. Resp: No accessory muscle use. No crackles. No wheezes. No rhonchi. No dullness on percussion. CV: Regular rate. Regular rhythm. No murmur or rub. No edema. GI: Non-tender. Non-distended. No hernia. Skin: Warm, dry, normal texture and turgor. No nodule on exposed extremities. Lymph: No cervical LAD. No supraclavicular LAD. M/S: No cyanosis. No clubbing. No joint deformity. Neuro: Moves all four extremities. CN 2-12 tested, no defect noted. Psych: Oriented x 3. No anxiety. Awake. Alert. Intact judgement and insight.     Data    LABS  CBC:   Recent Labs * No resolved hospital problems. *    Patient is a 67-year-old female with a past medical history of multiple sclerosis, thyroid disease who presented with chills and subjective fevers. Patient has urinary frequency and had recent Botox procedure to her bladder with instrumentation. Patient also has some mild cough    Possible sepsis on admission-improving  -Patient was tachycardic with an elevated procalcitonin  -No fever white count normal  -Blood cultures pending    UTI-secondary to straight catheterization  -Recent instrumentation. Treat as complicated UTI  -On antibiotics  -Culture pending    Possible pneumonia  -Chest x-ray shows possible basilar pneumonia  -Already on antibiotics    History of MS  -Continue home meds    Hypothyroidism  -Continue Synthyroid      DVT Prophylaxis: Lovenox  Diet: ADULT DIET; Regular  Code Status: Full Code        Discharge plan -possibly tomorrow. Await urine culture    The patient and / or the family were informed of the results of any tests, a time was given to answer questions, a plan was proposed and they agreed with plan. Discussed with consulting physicians, nursing and social work     The note was completed using EMR. Every effort was made to ensure accuracy; however, inadvertent computerized transcription errors may be present.        Elida Smith MD

## 2021-07-05 NOTE — CONSULTS
Clinical Pharmacy Note  Vancomycin Consult    Conception Riccardo is a 69 y.o. female ordered Vancomycin for pneumonia (CAP); consult received from Dr. Cally Walden to manage therapy. Also receiving Maxipime 2000 mg every 12 hours. Patient Active Problem List   Diagnosis    Senile osteoporosis    Sepsis ([address]       Allergies:  Patient has no known allergies. Temp max:  Temp (24hrs), Av °F (37.2 °C), Min:98.2 °F (36.8 °C), Max:99.7 °F (37.6 °C)      Recent Labs     21  1628   WBC 9.7       Recent Labs     21  1628   BUN 21*   CREATININE 1.0       No intake or output data in the 24 hours ending 21    Culture Results:      Ht Readings from Last 1 Encounters:   21 5' 5\" (1.651 m)        Wt Readings from Last 1 Encounters:   21 162 lb 7.7 oz (73.7 kg)         Estimated Creatinine Clearance: 52 mL/min (based on SCr of 1 mg/dL). Assessment/Plan:  Vancomycin 1250 mg IV times one dose. Goal trough level of 15-20 mg/L. Level ordered for vancomycin random level at 0600 21. Thank you for the consult.      Say Dobbins RPh, PRS 2021  8:11 PM

## 2021-07-05 NOTE — PROGRESS NOTES
Occupational Therapy   Occupational Therapy Initial Assessment  Date: 2021   Patient Name: Hiram Gu  MRN: [id number]     : 1950    Date of Service: 2021    Discharge Recommendations:  Home with assist PRN  OT Equipment Recommendations  Equipment Needed: Minda Gu scored a 20/24 on the AM-PAC ADL Inpatient form. At this time, no further OT is recommended upon discharge due to current level of functioning and family support. Recommend patient returns to prior setting with prior services. Assessment   Performance deficits / Impairments: Decreased functional mobility ; Decreased safe awareness;Decreased balance;Decreased endurance;Decreased strength  Assessment: 68 y/o female admitted 2021 with septicemia, Acute cystitis without hematuria , and PNA. Pt with history of MS. PTA pt lives at home with spouse, was independent with ADLs and functional mobility with cane in community only. Today, pt complete functional mobility around room with cane and CGA- pt had 1 episode of knee buckling and able to self correct. Pt then ambulated with RW with improved stability/balance and SBA. pt required CGA for balance during standing components of ADLs. Pt will benefit from skilled therapy while in hospital with anticipation of returning home at discharge. Prognosis: Good  Decision Making: Medium Complexity  OT Education: OT Role;Transfer Training;Plan of Care  REQUIRES OT FOLLOW UP: Yes  Activity Tolerance  Activity Tolerance: Patient Tolerated treatment well  Safety Devices  Safety Devices in place: Yes  Type of devices: Nurse notified;Gait belt; Chair alarm in place;Call light within reach; Left in chair           Patient Diagnosis(es): The primary encounter diagnosis was Septicemia ([facility] [address] Diagnoses of Acute cystitis without hematuria and Pneumonia due to infectious organism, unspecified laterality, unspecified part of lung were also pertinent to this visit.      has a past medical history of Multiple sclerosis ([facility] [address] [address] OCD (obsessive compulsive disorder), Osteopenia, Syncopal episodes, and Thyroid disease. has a past surgical history that includes  section; Tonsillectomy; Colonoscopy; sinus surgery; and Colonoscopy (N/A, 2020). Restrictions  Restrictions/Precautions  Restrictions/Precautions: Fall Risk, Up as Tolerated  Position Activity Restriction  Other position/activity restrictions: hx MS    Subjective   General  Chart Reviewed: Yes  Patient assessed for rehabilitation services?: Yes  Additional Pertinent Hx: 70 y/o female admitted 2021 with septicemia, Acute cystitis without hematuria , and PNA. Pt with history of MS. Family / Caregiver Present: No  Referring Practitioner: Janelle Winn MD  Subjective  Subjective: Pt seen bedside and agreeable to therapy  General Comment  Comments: Per RN ok for therapy    Social/Functional History  Social/Functional History  Lives With: Spouse ([place])  Type of Home: House  Home Layout: Bed/Bath upstairs, 1/2 bath on main level (laundry upstairs with bed/bath)  Home Access: Stairs to enter without rails  Entrance Stairs - Number of Steps: 3 LEONARD; 7 + 8 steps with argelia rails up to bed/bath  Bathroom Shower/Tub: Walk-in shower, Tub/Shower unit (Pt uses Walk-In Shower.)  Bathroom Toilet: Standard  Bathroom Equipment: Built-in shower seat  Bathroom Accessibility: Accessible  Home Equipment: Cane  ADL Assistance: Independent (pt self caths)  Homemaking Assistance: Independent (Shared with .)  Ambulation Assistance: Independent (Without assist device at home; uses cane when going out.)  Transfer Assistance: Independent  Active : Yes ( usually drives.)  Occupation: Retired  Additional Comments:  reports pt with gradual decrease overall strength/endurance with functional activities.   2 recent falls when ambulating in yard       Objective   Vision: Impaired  Vision Exceptions: Wears glasses at all times  Hearing: Within functional limits    Orientation  Overall Orientation Status: Within Functional Limits     Balance  Sitting Balance: Stand by assistance  Standing Balance: Contact guard assistance  Standing Balance  Time: ~3-4 min at sink for grooming tasks  Functional Mobility  Functional Mobility Comments: few steps bed> chair. chair<> bathroom with cane and CGA. Pt with 1 episode of slight knee buckling (pt eports it happens at home sometimes0. Pt then ambulate chair <> doorway with RW ans SBA. ADL  Grooming: Contact guard assistance (standing at sink for grooming tasks)  Additional Comments: Anticipate pt will require min A for LB bathing/dressing, CGA for UB bathing/dressing     Tone RUE  RUE Tone: Normotonic  Tone LUE  LUE Tone: Normotonic     Bed mobility  Supine to Sit: Stand by assistance (HOB elevated. Use of Bedrail.)  Sit to Supine:  (pt in chair at end of session)  Transfers  Sit to stand: Stand by assistance  Stand to sit: Stand by assistance     Cognition  Overall Cognitive Status: WFL         LUE AROM (degrees)  LUE AROM : WFL  Left Hand AROM (degrees)  Left Hand AROM: WFL  RUE AROM (degrees)  RUE AROM : WFL  Right Hand AROM (degrees)  Right Hand AROM: WFL          Plan   Plan  Times per week: 3-5  Current Treatment Recommendations: Strengthening, Endurance Training, Balance Training, Gait Training, Functional Mobility Training, Self-Care / ADL    AM-PAC Score  AM-[facility] Inpatient Daily Activity Raw Score: 20 (07/05/21 1235)  AM-PAC Inpatient ADL T-Scale Score : 42.03 (07/05/21 1235)  ADL Inpatient CMS 0-100% Score: 38.32 (07/05/21 1235)  ADL Inpatient CMS G-Code Modifier : Tank Rivera (07/05/21 [facility]5)    Goals  Short term goals  Time Frame for Short term goals: Prior to DC:   Short term goal 1: Pt will complete ADL transfers/mobility with supervision  Short term goal 2: Pt will tolerate standing > 5 min for functional task with supervision  Short term goal 3: Pt will complete LB dressing with supervision  Short term goal 4: Pt will complete toileting with supervision  Patient Goals   Patient goals : to return home       Therapy Time   Individual Concurrent Group Co-treatment   Time In 0840         Time Out 0920         Minutes 40         Timed Code Treatment Minutes: 25 Minutes     This note to serve as OT d/c summary if pt is d/c-ed prior to next therapy session.     Maegan Aldana, OTR/L

## 2021-07-06 VITALS
DIASTOLIC BLOOD PRESSURE: 53 MMHG | HEART RATE: 77 BPM | TEMPERATURE: 98.1 F | WEIGHT: 162.92 LBS | OXYGEN SATURATION: 97 % | HEIGHT: 65 IN | BODY MASS INDEX: 27.14 KG/M2 | SYSTOLIC BLOOD PRESSURE: 127 MMHG | RESPIRATION RATE: 16 BRPM

## 2021-07-06 LAB
ANION GAP SERPL CALCULATED.3IONS-SCNC: 5 MMOL/L (ref 3–16)
BUN BLDV-MCNC: 17 MG/DL (ref 7–20)
CALCIUM SERPL-MCNC: 8 MG/DL (ref 8.3–10.6)
CHLORIDE BLD-SCNC: 109 MMOL/L (ref 99–110)
CO2: 25 MMOL/L (ref 21–32)
CREAT SERPL-MCNC: 0.9 MG/DL (ref 0.6–1.2)
GFR AFRICAN AMERICAN: >60
GFR NON-AFRICAN AMERICAN: >60
GLUCOSE BLD-MCNC: 117 MG/DL (ref 70–99)
HCT VFR BLD CALC: 30.3 % (ref 36–48)
HEMOGLOBIN: 10.7 G/DL (ref 12–16)
MCH RBC QN AUTO: 33.9 PG (ref 26–34)
MCHC RBC AUTO-ENTMCNC: 35.4 G/DL (ref 31–36)
MCV RBC AUTO: 95.9 FL (ref 80–100)
MRSA SCREEN RT-PCR: NORMAL
ORGANISM: ABNORMAL
PDW BLD-RTO: 13 % (ref 12.4–15.4)
PLATELET # BLD: 98 K/UL (ref 135–450)
PMV BLD AUTO: 8.3 FL (ref 5–10.5)
POTASSIUM REFLEX MAGNESIUM: 3.7 MMOL/L (ref 3.5–5.1)
RBC # BLD: 3.15 M/UL (ref 4–5.2)
SODIUM BLD-SCNC: 139 MMOL/L (ref 136–145)
URINE CULTURE, ROUTINE: ABNORMAL
WBC # BLD: 5.5 K/UL (ref 4–11)

## 2021-07-06 PROCEDURE — 6370000000 HC RX 637 (ALT 250 FOR IP): Performed by: INTERNAL MEDICINE

## 2021-07-06 PROCEDURE — 6360000002 HC RX W HCPCS: Performed by: PHYSICIAN ASSISTANT

## 2021-07-06 PROCEDURE — 80048 BASIC METABOLIC PNL TOTAL CA: CPT

## 2021-07-06 PROCEDURE — 51798 US URINE CAPACITY MEASURE: CPT

## 2021-07-06 PROCEDURE — 85027 COMPLETE CBC AUTOMATED: CPT

## 2021-07-06 PROCEDURE — 97116 GAIT TRAINING THERAPY: CPT

## 2021-07-06 PROCEDURE — 2580000003 HC RX 258: Performed by: PHYSICIAN ASSISTANT

## 2021-07-06 PROCEDURE — 51701 INSERT BLADDER CATHETER: CPT

## 2021-07-06 PROCEDURE — 94761 N-INVAS EAR/PLS OXIMETRY MLT: CPT

## 2021-07-06 PROCEDURE — 97530 THERAPEUTIC ACTIVITIES: CPT

## 2021-07-06 PROCEDURE — 36415 COLL VENOUS BLD VENIPUNCTURE: CPT

## 2021-07-06 PROCEDURE — 2580000003 HC RX 258: Performed by: INTERNAL MEDICINE

## 2021-07-06 PROCEDURE — 6360000002 HC RX W HCPCS: Performed by: INTERNAL MEDICINE

## 2021-07-06 RX ORDER — IBUPROFEN 400 MG/1
400 TABLET ORAL ONCE
Status: COMPLETED | OUTPATIENT
Start: 2021-07-06 | End: 2021-07-06

## 2021-07-06 RX ORDER — CEFUROXIME AXETIL 250 MG/1
250 TABLET ORAL 2 TIMES DAILY
Qty: 20 TABLET | Refills: 0 | Status: SHIPPED | OUTPATIENT
Start: 2021-07-06 | End: 2021-07-16

## 2021-07-06 RX ADMIN — ENOXAPARIN SODIUM 40 MG: 40 INJECTION SUBCUTANEOUS at 08:23

## 2021-07-06 RX ADMIN — VANCOMYCIN HYDROCHLORIDE 1000 MG: 1 INJECTION, POWDER, LYOPHILIZED, FOR SOLUTION INTRAVENOUS at 03:50

## 2021-07-06 RX ADMIN — CEFTRIAXONE 1000 MG: 1 INJECTION, POWDER, FOR SOLUTION INTRAMUSCULAR; INTRAVENOUS at 12:56

## 2021-07-06 RX ADMIN — CEFEPIME HYDROCHLORIDE 2000 MG: 2 INJECTION, POWDER, FOR SOLUTION INTRAVENOUS at 04:58

## 2021-07-06 RX ADMIN — LEVOTHYROXINE SODIUM 125 MCG: 0.03 TABLET ORAL at 05:21

## 2021-07-06 RX ADMIN — IBUPROFEN 400 MG: 400 TABLET, FILM COATED ORAL at 05:21

## 2021-07-06 RX ADMIN — FLUVOXAMINE MALEATE 100 MG: 50 TABLET, COATED ORAL at 08:23

## 2021-07-06 ASSESSMENT — PAIN DESCRIPTION - LOCATION: LOCATION: HEAD

## 2021-07-06 ASSESSMENT — PAIN SCALES - GENERAL
PAINLEVEL_OUTOF10: 0
PAINLEVEL_OUTOF10: 8

## 2021-07-06 ASSESSMENT — PAIN DESCRIPTION - ONSET: ONSET: GRADUAL

## 2021-07-06 ASSESSMENT — PAIN DESCRIPTION - FREQUENCY: FREQUENCY: CONTINUOUS

## 2021-07-06 ASSESSMENT — PAIN DESCRIPTION - DESCRIPTORS: DESCRIPTORS: HEADACHE

## 2021-07-06 ASSESSMENT — PAIN DESCRIPTION - ORIENTATION: ORIENTATION: MID

## 2021-07-06 ASSESSMENT — PAIN DESCRIPTION - PROGRESSION: CLINICAL_PROGRESSION: GRADUALLY WORSENING

## 2021-07-06 ASSESSMENT — PAIN DESCRIPTION - PAIN TYPE: TYPE: ACUTE PAIN

## 2021-07-06 ASSESSMENT — PAIN - FUNCTIONAL ASSESSMENT: PAIN_FUNCTIONAL_ASSESSMENT: ACTIVITIES ARE NOT PREVENTED

## 2021-07-06 NOTE — PROGRESS NOTES
Patient has  Progress Note  Admit Date: 7/4/2021      PCP: Janet Saavedra MD     CC: F/U for fever     Days in hospital:  2    SUBJECTIVE / Interval History:  Patient feels okay. Excited to go home    Needing to straight cath herself  Allergies  Patient has no known allergies. Medications    Scheduled Meds:   vancomycin  1,000 mg Intravenous Q18H    cefepime  2,000 mg Intravenous Q12H    sodium chloride flush  10 mL Intravenous 2 times per day    enoxaparin  40 mg Subcutaneous Daily    fluvoxaMINE  100 mg Oral BID    levothyroxine  125 mcg Oral Daily    metoprolol succinate  50 mg Oral Nightly    vancomycin (VANCOCIN) intermittent dosing (placeholder)   Other RX Placeholder     Continuous Infusions:   sodium chloride      sodium chloride 75 mL/hr at 07/05/21 2032       PRN Meds:  sodium chloride flush, sodium chloride, potassium chloride, magnesium sulfate, ondansetron **OR** ondansetron, magnesium hydroxide, acetaminophen **OR** acetaminophen, clonazePAM    Vitals    /77   Pulse 74   Temp 97.9 °F (36.6 °C) (Oral)   Resp 14   Ht 5' 5\" (1.651 m)   Wt 162 lb 14.7 oz (73.9 kg)   SpO2 95%   BMI 27.11 kg/m²     Exam:    Gen: No distress. Eyes: PERRL. No sclera icterus. No conjunctival injection. ENT: No discharge. Pharynx clear. External appearance of ears and nose normal.  Neck: Trachea midline. No obvious mass. Resp: No accessory muscle use. No crackles. No wheezes. No rhonchi. No dullness on percussion. CV: Regular rate. Regular rhythm. No murmur or rub. No edema. GI: Non-tender. Non-distended. No hernia. Skin: Warm, dry, normal texture and turgor. No nodule on exposed extremities. Lymph: No cervical LAD. No supraclavicular LAD. M/S: No cyanosis. No clubbing. No joint deformity. Neuro: Moves all four extremities. CN 2-12 tested, no defect noted. Psych: Oriented x 3. No anxiety. Awake. Alert. Intact judgement and insight.     Data    LABS  CBC:   Recent Labs 07/04/21  1628 07/05/21  0522 07/06/21  0532   WBC 9.7 9.8 5.5   HGB 12.6 11.6* 10.7*   HCT 36.2 32.8* 30.3*   MCV 96.6 96.4 95.9    108* 98*     BMP:   Recent Labs     07/04/21  1628 07/05/21  0522 07/06/21  0532    143 139   K 4.3 3.9 3.7   CL 98* 111* 109   CO2 28 24 25   BUN 21* 18 17   CREATININE 1.0 0.9 0.9   GLUCOSE 142* 121* 117*     POC GLUCOSE:  No results for input(s): POCGLU in the last 72 hours. LIVER PROFILE:   Recent Labs     07/04/21  1628   AST 19   ALT 14   LABALBU 3.9   BILITOT 0.8   ALKPHOS 104     PT/INR: No results for input(s): PROTIME, INR in the last 72 hours. APTT: No results for input(s): APTT in the last 72 hours. UA:  Recent Labs     07/04/21  1700   COLORU YELLOW   PHUR 6.0   WBCUA 509*   RBCUA 8*   BACTERIA 4+*   CLARITYU TURBID*   SPECGRAV 1.016   LEUKOCYTESUR LARGE*   UROBILINOGEN 0.2   BILIRUBINUR Negative   BLOODU SMALL*   GLUCOSEU Negative   KETUA Negative     Microbiology:  Wound Culture:   Recent Labs     07/04/21  1700   ORG Escherichia coli*     Nasal Culture:   Recent Labs     07/04/21  1700 07/05/21  0830   ORG Escherichia coli*  --    MRSAPCR  --  Negative  MRSA DNA not detected. Normal Range: Not detected       Blood Culture:   Recent Labs     07/04/21  1715 07/04/21  1819 07/04/21 2004   BC   < >  --  No Growth to date. Any change in status will be called. BLOODCULT2  --  No Growth to date. Any change in status will be called. --     < > = values in this interval not displayed. Fungal Culture:   No results for input(s): FUNGSM in the last 72 hours. No results for input(s): FUNCXBLD in the last 72 hours. CSF Culture:  No results for input(s): COLORCSF, APPEARCSF, CFTUBE, CLOTCSF, WBCCSF, RBCCSF, NEUTCSF, NUMCELLSCSF, LYMPHSCSF, MONOCSF, GLUCCSF, VOLCSF in the last 72 hours. Respiratory Culture:  No results for input(s): Laya Piyush in the last 72 hours. AFB:No results for input(s): AFBSMEAR in the last 72 hours.   Urine Culture  Recent Labs     07/04/21  1700   LABURIN >100,000 CFU/ml       RADIOLOGY:    XR CHEST PORTABLE   Final Result   No acute disease. Increased markings are seen at the lung bases, likely due   to low lung volumes rather than pneumonia             CONSULTS:    PHARMACY TO DOSE VANCOMYCIN    ASSESSMENT AND PLAN:      Active Problems:    Sepsis ([address]  Resolved Problems:    * No resolved hospital problems. *    Patient is a 34-year-old female with a past medical history of multiple sclerosis, thyroid disease who presented with chills and subjective fevers. Patient has urinary frequency and had recent Botox procedure to her bladder with instrumentation. Patient also has some mild cough. Patient is admitted with UTI secondary to self-catheterization and possible instrumentation. Culture grew E. coli which is pansensitive. Patient will be switched to oral antibiotics on discharge    Possible sepsis on admission-improving  -Patient was tachycardic with an elevated procalcitonin  -No fever white count normal  -Blood cultures pending    UTI-secondary to straight catheterization  -Recent instrumentation. Treat as complicated UTI  -On antibiotics  -Culture E. coli    Possible pneumonia  -Chest x-ray shows possible basilar pneumonia, most likely atelectasis  -Already on antibiotics    History of MS  -Continue home meds    Hypothyroidism  -Continue Synthyroid      DVT Prophylaxis: Lovenox  Diet: ADULT DIET; Regular  Code Status: Full Code        Discharge plan -today    The patient and / or the family were informed of the results of any tests, a time was given to answer questions, a plan was proposed and they agreed with plan. Discussed with consulting physicians, nursing and social work     The note was completed using EMR. Every effort was made to ensure accuracy; however, inadvertent computerized transcription errors may be present.        Endy Cooney MD

## 2021-07-06 NOTE — PROGRESS NOTES
Pt requesting to be bladder scanned and straight cathed at this time. Pt bladder scanned for a volume of 562mL. Straight cath performed also at this time for a total volume of 550mL. Post void residual checked; 14mL noted. Will continue to bladder scan TID and straight cath PRN per orders.

## 2021-07-06 NOTE — PROGRESS NOTES
Pt declining bladder scan/straight cath at this time stating \"it doesn't feel like there's much in there right now\". Pt last straight cathed earlier this evening around 1900. Pt states she will notify this RN when she is ready to be bladder scanned/straight cathed later tonight.

## 2021-07-06 NOTE — DISCHARGE SUMMARY
Hospital Medicine Discharge Summary      Patient ID: Uzair Lynn      Patient's PCP: Charito Mancini MD    Admit Date: 7/4/2021     Discharge Date: 7/6/2021  The patient was seen and examined on day of discharge and this discharge summary is in conjunction with any daily progress note from day of discharge. Admitting Physician: Fidel Feliz MD    Discharge Physician: Alan Dallas MD     Admitted for   Chief Complaint   Patient presents with    Fever     Arrived by [address] [place] [address] EMS withc/o fever and chills since last night. Admitting Diagnosis Sepsis ([address] [A41.9]    Discharge Diagnoses: Active Hospital Problems    Diagnosis Date Noted    Sepsis [facility]) [A41.9] 07/04/2021       Follow Up: Primary Care Physician in one week    PCP to Follow up on   [address] Course:     Patient is a 69-year-old female with a past medical history of multiple sclerosis, thyroid disease who presented with chills and subjective fevers. Patient has urinary frequency and had recent Botox procedure to her bladder with instrumentation. Patient also has some mild cough. Patient is admitted with UTI secondary to self-catheterization and possible instrumentation. Culture grew E. coli which is pansensitive. Patient will be switched to oral antibiotics on discharge     Possible sepsis on admission-improving  -Patient was tachycardic with an elevated procalcitonin  -No fever white count normal  -Blood cultures pending     UTI-secondary to straight catheterization  -Recent instrumentation.   Treat as complicated UTI  -On antibiotics  -Culture E. coli     Possible pneumonia  -Chest x-ray shows possible basilar pneumonia, most likely atelectasis  -Already on antibiotics     History of MS  -Continue home meds     Hypothyroidism  -Continue Synthyroid       Consults:     None        Disposition: home    Discharged Condition: Stable    Code Status: Full Code    Activity: activity as tolerated    Diet: regular diet        Labs: For convenience and continuity at follow-up the following most recent labs are provided:    CBC:   Lab Results   Component Value Date    WBC 5.5 07/06/2021    HGB 10.7 07/06/2021    HCT 30.3 07/06/2021    PLT 98 07/06/2021       RENAL:   Lab Results   Component Value Date     07/06/2021    K 3.7 07/06/2021     07/06/2021    CO2 25 07/06/2021    BUN 17 07/06/2021    CREATININE 0.9 07/06/2021           Discharge Medications:    [facility] Monday   Home Medication Instructions HCT:[id number]    Printed on:07/06/21 5232   Medication Information                      aspirin 81 MG EC tablet  Take 81 mg by mouth daily             calcium citrate (CALCITRATE) 950 MG tablet  Take 1 tablet by mouth 2 times daily              cefUROXime (CEFTIN) 250 MG tablet  Take 1 tablet by mouth 2 times daily for 10 days             Cholecalciferol (VITAMIN D) 2000 units CAPS capsule  Take 4,000 capsules by mouth daily             clonazePAM (KLONOPIN) 0.5 MG tablet  Take 0.5 mg by mouth daily as needed for Anxiety. clonazePAM (KLONOPIN) 1 MG tablet  Take 1 mg by mouth daily as needed for Anxiety. fluvoxaMINE (LUVOX) 100 MG tablet  Take 100 mg by mouth 2 times daily             levothyroxine (SYNTHROID) 125 MCG tablet  Take 125 mcg by mouth Daily             metoprolol succinate (TOPROL XL) 50 MG extended release tablet  Take 50 mg by mouth nightly             natalizumab (TYSABRI) 300 MG/15ML injection  Infuse intravenously every 28 days             Omega-3 Fatty Acids (FISH OIL) 1000 MG CAPS  Take 1,000 mg by mouth daily             polyethylene glycol (GLYCOLAX) 17 GM/SCOOP powder  Take 17 g by mouth every morning                 No future appointments. Time Spent on discharge is more than 45 minutes in the examination, evaluation, counseling and review of medications and discharge plan.       Signed:  Tyrell Kennedy MD   7/6/2021    The note was completed using EMR. Every effort was made to ensure accuracy; however, inadvertent computerized transcription errors may be present. Thank you Hortencia Cabrales MD for the opportunity to be involved in this patient's care. If you have any questions or concerns please feel free to contact me at [phone].

## 2021-07-06 NOTE — PROGRESS NOTES
Peripheral IV and telemetry monitor removed without any difficulty. AVS printed and reviewed with patient and spouse; emphasis placed on new medication and self care post discharge. They were given the opportunity to ask questions and verbalized understanding of all discharge instructions. Patient taken via wheelchair to private vehicle by this nurse to return home with .

## 2021-07-06 NOTE — CARE COORDINATION
DISCHARGE SUMMARY     DATE OF DISCHARGE: 7/6/2021    DISCHARGE DESTINATION: Home with     TRANSPORTATION:  to transport     Time: later this afternoon    COMMENTS: Met with patient. Patient ready to DC home today. Denies needs. Rolling walker delivered to patient by [employer]. Family transport.    Electronically signed by Og Godoy RN Case Management [phone] on 7/6/2021 at 2:09 PM
Elio/Josh received referral from  for [address]. Received PT notes and DME Orders. Will verify patient's insurance and follow up with patient to deliver the ordered item(s) prior to discharge today. Thank you for the referral.  Electronically signed by Randal Roberts on 7/6/2021 at 1:32 PM  Cell ph# [phone]    UPDATE 7/6/21 AT 2:01 PM -   [facility] Medical rep delivered requested 2-Wheeled Jairoerdanyelle Otoole to patient and reviewed insurance coverage and equipment set up with patient. Notified RN.
YESSICA Garrison  [facility]   [phone]    Electronically signed by YESSICA Oliveira on 7/5/2021 at 12:59 PM

## 2021-07-06 NOTE — PROGRESS NOTES
Physical Therapy  Facility/Department: [address] PROGRESSIVE CARE  Daily Treatment Note/Discharge Summary  NAME: Presley Nova  : 1950  MRN: [id number]    Date of Service: 2021    Discharge Recommendations:  Home with assist PRN   PT Equipment Recommendations  Equipment Needed: Yes  Mobility Devices: Kelleen Brizuela: Rolling    Assessment   Body structures, Functions, Activity limitations: Decreased functional mobility ; Decreased strength;Decreased balance  Assessment: 68 y/o female admit 2021 with Acute Cystitis, Pneumonia, Septicemia. PMH as noted including MS, Osteopenia, Syncopal Episodes, OCD. PTA pt living with  in house with few steps to enter and 2nd floor bed/bath. Pt reports adequate assist/support upon d/c. Do not anticipate need cont PT Services upon d/c; however mobility improved with use of Walker (rather than cane, used pta). Pt/ appear receptive to Charna Jennifer upon d/c. No further PT indicated upon d/c. Prognosis: Good  Decision Making: Low Complexity  History: 68 y/o female admit 2021 with Acute Cystitis, Pneumonia, Septicemia. PMH as noted including MS, Osteopenia, Syncopal Episodes, OCD. Exam: See above. Clinical Presentation: See above. Patient Education: Role of PT, POC, Need to call for assist, Safe use fo Charna Jennifer. Barriers to Learning: None. REQUIRES PT FOLLOW UP: No  Activity Tolerance  Activity Tolerance: Patient Tolerated treatment well     Patient Diagnosis(es): The primary encounter diagnosis was Septicemia ([address] Diagnoses of Acute cystitis without hematuria and Pneumonia due to infectious organism, unspecified laterality, unspecified part of lung were also pertinent to this visit. has a past medical history of Multiple sclerosis ([address] OCD (obsessive compulsive disorder), Osteopenia, Syncopal episodes, and Thyroid disease. has a past surgical history that includes  section;  Tonsillectomy; Colonoscopy; sinus surgery; and Colonoscopy (N/A, 8/26/2020). Restrictions  Restrictions/Precautions  Restrictions/Precautions: Fall Risk, Up as Tolerated  Position Activity Restriction  Other position/activity restrictions: H/O MS  Subjective   General  Chart Reviewed: Yes  Additional Pertinent Hx: 68 y/o female admit 7/4/2021 with Acute Cystitis, Pneumonia, Septicemia. PMH as noted including MS, Osteopenia, Syncopal Episodes, OCD. Response To Previous Treatment: Patient with no complaints from previous session. Family / Caregiver Present: No  Referring Practitioner: Dr. Aleshia Frost  Subjective  Subjective: Pt agreeable to PT Rx. Orientation  Orientation  Overall Orientation Status: Within Functional Limits  Cognition   Cognition  Overall Cognitive Status: WFL  Objective   Bed mobility  Supine to Sit: Independent (HOB elevated. Use of bedrail.)  Transfers  Sit to Stand: Supervision (With Cara Mark. Initial cues for safe hand placement.)  Stand to sit: Supervision (With Cara Mark. Cues for safe hand placement.)  Ambulation  Ambulation?: Yes  More Ambulation?: Yes  Ambulation 1  Surface: level tile  Device: Rolling Walker  Distance: Pt amb within hospital room setting (~ 50' x 2) with Walker SBA/Supervision. No LE buckling/giving way. Gait slight guarded although no specific LOB noted. AM-PAC Score  AM-PAC Inpatient Mobility Raw Score : 22 (07/06/21 1307)  AM-PAC Inpatient T-Scale Score : 53.28 (07/06/21 1307)  Mobility Inpatient CMS 0-100% Score: 20.91 (07/06/21 1307)  Mobility Inpatient CMS G-Code Modifier : CJ (07/06/21 1307)          Goals  Short term goals  Time Frame for Short term goals: Upon d/c acute care setting. Short term goal 1: Bed Mob Independent. 7/6 Goal met. Short term goal 2: Transfers with assist device Supervision. 7/6 Goal met. Short term goal 3: Amb with assist device (walker vs cane) ' SBA/Supervision. 7/6 Goal near met. Patient Goals   Patient goals : Return home with .     Plan Plan  Times per week: D/C Acute Care PT Services.   Current Treatment Recommendations: Strengthening, Functional Mobility Training, Transfer Training, Gait Training, Safety Education & Training, Patient/Caregiver Education & Training  Safety Devices  Type of devices: Call light within reach, Chair alarm in place, Left in chair, Nurse notified     Therapy Time   Individual Concurrent Group Co-treatment   Time In [address]         Time Out 1300         Minutes [facility], PT

## 2021-07-07 ENCOUNTER — CARE COORDINATION (OUTPATIENT)
Dept: CASE MANAGEMENT | Age: 71
End: 2021-07-07

## 2021-07-07 DIAGNOSIS — M81.0 SENILE OSTEOPOROSIS: Primary | ICD-10-CM

## 2021-07-07 NOTE — CARE COORDINATION
Laura [address] Transitions Initial Follow Up Call    Call within 2 business days of discharge: Yes    Patient: Christ Abdalla Patient : 1950   MRN: [id number]  Reason for Admission: septicemia  Discharge Date: 21 RARS: Readmission Risk Score: 12      Last Discharge [facility]       Complaint Diagnosis Description Type Department Provider    21 Fever Septicemia ([address] . .. ED to Hosp-Admission (Discharged) (ADMITTED) John Rice MD; Zaria Winters. .. Transitions of Care Initial Call    Challenges to be reviewed by the provider   Additional needs identified to be addressed with provider: No  none             Method of communication with provider : none      Advance Care Planning:   Does patient have an Advance Directive:    ACP discussed with patient by CM    Was this a readmission? No    Care Transition Nurse (CTN) contacted the patient by telephone to perform post hospital discharge assessment. Verified name and  with patient as identifiers. Provided introduction to self, and explanation of the CTN role. CTN reviewed discharge instructions, medical action plan and red flags with patient who verbalized understanding. Patient given an opportunity to ask questions and does not have any further questions or concerns at this time. Were discharge instructions available to patient? Yes. Reviewed appropriate site of care based on symptoms and resources available to patient including: PCP and Specialist. The patient agrees to contact the PCP office for questions related to their healthcare. Medication reconciliation was performed with patient, who verbalizes understanding of administration of home medications. Writer spoke with patient, Jayashree Juarez. She stated felt \"pretty rough\" this morning, cath'd herself at 4am, took shower and then had breakfast, was very tired after that. Patient stated she feels better now. She stated does not think she has a fever.   Patient stated has urology f/u appt on Monday, 7-12 and will make f/u with PCP some time after that. She stated her  will drive her to the appt. CTN provided contact information. Non [facility] PCP no f/u to be done         Non-face-to-face services provided:  Obtained and reviewed discharge summary and/or continuity of care documents  Assessment and support for treatment adherence and medication management-[id number] completed    Care Transitions 24 Hour Call    Do you have any ongoing symptoms?: Yes  Patient-reported symptoms: Fatigue  Do you have a copy of your discharge instructions?: Yes  Do you have all of your prescriptions and are they filled?: Yes  Have you scheduled your follow up appointment?: Yes  How are you going to get to your appointment?: Car - family or friend to transport  Were you discharged with any Home Care or Post Acute Services: No  Care Transitions Interventions         Follow Up  No future appointments.     Dajuan Blandon RN

## 2021-07-08 LAB — BLOOD CULTURE, ROUTINE: NORMAL

## 2021-07-09 LAB
BLOOD CULTURE, ROUTINE: NORMAL
CULTURE, BLOOD 2: NORMAL

## 2021-07-14 NOTE — PROGRESS NOTES
Physician Progress Note      Deborah Bobby  [facility] #:                  [id number]  :                       1950  ADMIT DATE:       2021 4:20 PM  [address] DATE:        2021 3:30 PM  RESPONDING  PROVIDER #:        Kelly Snyder MD          QUERY TEXT:    Dear Dr. Donna Higginbotham,  Patient admitted with UTI and Sepsis. Noted documentation in H and P,   Progress Notes and Discharge summary of possible pneumonia and noted most   likely atelectasis noted . If possible, please document in progress notes and discharge summary if you   are evaluating and /or treating any of the following: The medical record reflects the following:  Risk Factors: Hx MS, Current sepsis  Clinical Indicators:  ED per EMS for fever, chills, body aches, slight   cough, Temp=99.7, CXR-No acute disease. Increased markings are seen at the   lung bases, likely due to low lung volumes rather than pneumonia. H and P   notes \"Suspect bilateral lower lung pneumonia in the setting of multiple   sclerosis, likely gram-positive pneumonia\" PCP notes \"Possible pneumonia  -Chest x-ray shows possible basilar pneumonia\", PCP notes on  and Discharge   summary \"Possible pneumonia-Chest x-ray shows possible basilar pneumonia,   most likely atelectasis-Already on antibiotics\"  Treatment: vanc, cefepime  Thank you,  Hermann Gutierrez RN, CDS  [email or web address]  Options provided:  -- Pneumonia confirmed  -- Atelectasis confirmed and pneumonia ruled out  -- Pneumonia and atelectasis confirmed  -- Other - I will add my own diagnosis  -- Disagree - Not applicable / Not valid  -- Disagree - Clinically unable to determine / Unknown  -- Refer to Clinical Documentation Reviewer    PROVIDER RESPONSE TEXT:    After study, atelectasis confirmed and pneumonia ruled out. Query created by:  Samantha Griffin Villanova on 2021 8:28 AM      Electronically signed by:  Kelly Snyder MD 2021 3:21 PM

## 2022-04-12 ENCOUNTER — HOSPITAL ENCOUNTER (OUTPATIENT)
Dept: WOMENS IMAGING | Age: 72
Discharge: HOME | End: 2022-04-12
Payer: MEDICARE

## 2022-04-12 DIAGNOSIS — Z12.31 BREAST CANCER SCREENING BY MAMMOGRAM: ICD-10-CM

## 2022-04-12 PROCEDURE — 77063 BREAST TOMOSYNTHESIS BI: CPT

## 2022-06-13 ENCOUNTER — APPOINTMENT (OUTPATIENT)
Dept: CT IMAGING | Age: 72
DRG: 690 | End: 2022-06-13
Payer: MEDICARE

## 2022-06-13 ENCOUNTER — APPOINTMENT (OUTPATIENT)
Dept: GENERAL RADIOLOGY | Age: 72
DRG: 690 | End: 2022-06-13
Payer: MEDICARE

## 2022-06-13 ENCOUNTER — HOSPITAL ENCOUNTER (INPATIENT)
Age: 72
LOS: 2 days | Discharge: HOME | DRG: 690 | End: 2022-06-15
Attending: EMERGENCY MEDICINE | Admitting: STUDENT IN AN ORGANIZED HEALTH CARE EDUCATION/TRAINING PROGRAM
Payer: MEDICARE

## 2022-06-13 DIAGNOSIS — N10 ACUTE PYELONEPHRITIS: Primary | ICD-10-CM

## 2022-06-13 PROBLEM — E03.9 HYPOTHYROID: Status: ACTIVE | Noted: 2022-06-13

## 2022-06-13 PROBLEM — G35 MULTIPLE SCLEROSIS (HCC): Status: ACTIVE | Noted: 2022-06-13

## 2022-06-13 PROBLEM — F41.9 ANXIETY: Status: ACTIVE | Noted: 2022-06-13

## 2022-06-13 PROBLEM — F42.9 OCD (OBSESSIVE COMPULSIVE DISORDER): Status: ACTIVE | Noted: 2022-06-13

## 2022-06-13 PROBLEM — I10 HTN (HYPERTENSION): Status: ACTIVE | Noted: 2022-06-13

## 2022-06-13 PROBLEM — N12 PYELONEPHRITIS: Status: ACTIVE | Noted: 2022-06-13

## 2022-06-13 PROBLEM — N17.9 AKI (ACUTE KIDNEY INJURY) (HCC): Status: ACTIVE | Noted: 2022-06-13

## 2022-06-13 LAB
A/G RATIO: 1 (ref 1.1–2.2)
ALBUMIN SERPL-MCNC: 3.1 G/DL (ref 3.4–5)
ALP BLD-CCNC: 110 U/L (ref 40–129)
ALT SERPL-CCNC: 13 U/L (ref 10–40)
ANION GAP SERPL CALCULATED.3IONS-SCNC: 12 MMOL/L (ref 3–16)
AST SERPL-CCNC: 18 U/L (ref 15–37)
BACTERIA: ABNORMAL /HPF
BASOPHILS ABSOLUTE: 0 K/UL (ref 0–0.2)
BASOPHILS RELATIVE PERCENT: 0.4 %
BILIRUB SERPL-MCNC: 0.3 MG/DL (ref 0–1)
BILIRUBIN URINE: NEGATIVE
BLOOD, URINE: ABNORMAL
BUN BLDV-MCNC: 29 MG/DL (ref 7–20)
CALCIUM SERPL-MCNC: 8.7 MG/DL (ref 8.3–10.6)
CHLORIDE BLD-SCNC: 98 MMOL/L (ref 99–110)
CLARITY: ABNORMAL
CO2: 27 MMOL/L (ref 21–32)
COLOR: YELLOW
CREAT SERPL-MCNC: 1.4 MG/DL (ref 0.6–1.2)
EOSINOPHILS ABSOLUTE: 0.1 K/UL (ref 0–0.6)
EOSINOPHILS RELATIVE PERCENT: 0.9 %
EPITHELIAL CELLS, UA: 6 /HPF (ref 0–5)
GFR AFRICAN AMERICAN: 45
GFR NON-AFRICAN AMERICAN: 37
GLUCOSE BLD-MCNC: 151 MG/DL (ref 70–99)
GLUCOSE URINE: NEGATIVE MG/DL
HCT VFR BLD CALC: 38.1 % (ref 36–48)
HEMOGLOBIN: 12.7 G/DL (ref 12–16)
HYALINE CASTS: 7 /LPF (ref 0–8)
KETONES, URINE: NEGATIVE MG/DL
LACTIC ACID, SEPSIS: 1.5 MMOL/L (ref 0.4–1.9)
LACTIC ACID, SEPSIS: 1.5 MMOL/L (ref 0.4–1.9)
LEUKOCYTE ESTERASE, URINE: ABNORMAL
LYMPHOCYTES ABSOLUTE: 0.4 K/UL (ref 1–5.1)
LYMPHOCYTES RELATIVE PERCENT: 5.7 %
MCH RBC QN AUTO: 31.7 PG (ref 26–34)
MCHC RBC AUTO-ENTMCNC: 33.4 G/DL (ref 31–36)
MCV RBC AUTO: 95 FL (ref 80–100)
MICROSCOPIC EXAMINATION: YES
MONOCYTES ABSOLUTE: 0.6 K/UL (ref 0–1.3)
MONOCYTES RELATIVE PERCENT: 9.1 %
NEUTROPHILS ABSOLUTE: 5.4 K/UL (ref 1.7–7.7)
NEUTROPHILS RELATIVE PERCENT: 83.9 %
NITRITE, URINE: NEGATIVE
PDW BLD-RTO: 13 % (ref 12.4–15.4)
PH UA: 5.5 (ref 5–8)
PLATELET # BLD: 156 K/UL (ref 135–450)
PMV BLD AUTO: 8.3 FL (ref 5–10.5)
POTASSIUM REFLEX MAGNESIUM: 4.2 MMOL/L (ref 3.5–5.1)
PRO-BNP: 2053 PG/ML (ref 0–124)
PROTEIN UA: 30 MG/DL
RBC # BLD: 4.01 M/UL (ref 4–5.2)
RBC UA: 1 /HPF (ref 0–4)
SODIUM BLD-SCNC: 137 MMOL/L (ref 136–145)
SPECIFIC GRAVITY UA: 1.02 (ref 1–1.03)
TOTAL PROTEIN: 6.3 G/DL (ref 6.4–8.2)
TROPONIN: <0.01 NG/ML
URINE REFLEX TO CULTURE: YES
URINE TYPE: ABNORMAL
UROBILINOGEN, URINE: 0.2 E.U./DL
WBC # BLD: 6.5 K/UL (ref 4–11)
WBC UA: 74 /HPF (ref 0–5)

## 2022-06-13 PROCEDURE — 87186 SC STD MICRODIL/AGAR DIL: CPT

## 2022-06-13 PROCEDURE — 6360000002 HC RX W HCPCS: Performed by: EMERGENCY MEDICINE

## 2022-06-13 PROCEDURE — 36415 COLL VENOUS BLD VENIPUNCTURE: CPT

## 2022-06-13 PROCEDURE — 70450 CT HEAD/BRAIN W/O DYE: CPT

## 2022-06-13 PROCEDURE — 2580000003 HC RX 258: Performed by: NURSE PRACTITIONER

## 2022-06-13 PROCEDURE — 87086 URINE CULTURE/COLONY COUNT: CPT

## 2022-06-13 PROCEDURE — 6360000004 HC RX CONTRAST MEDICATION: Performed by: EMERGENCY MEDICINE

## 2022-06-13 PROCEDURE — 96374 THER/PROPH/DIAG INJ IV PUSH: CPT

## 2022-06-13 PROCEDURE — 80053 COMPREHEN METABOLIC PANEL: CPT

## 2022-06-13 PROCEDURE — 85025 COMPLETE CBC W/AUTO DIFF WBC: CPT

## 2022-06-13 PROCEDURE — 74177 CT ABD & PELVIS W/CONTRAST: CPT

## 2022-06-13 PROCEDURE — 81001 URINALYSIS AUTO W/SCOPE: CPT

## 2022-06-13 PROCEDURE — 93005 ELECTROCARDIOGRAM TRACING: CPT | Performed by: EMERGENCY MEDICINE

## 2022-06-13 PROCEDURE — 87077 CULTURE AEROBIC IDENTIFY: CPT

## 2022-06-13 PROCEDURE — 71045 X-RAY EXAM CHEST 1 VIEW: CPT

## 2022-06-13 PROCEDURE — 87040 BLOOD CULTURE FOR BACTERIA: CPT

## 2022-06-13 PROCEDURE — 1200000000 HC SEMI PRIVATE

## 2022-06-13 PROCEDURE — 6370000000 HC RX 637 (ALT 250 FOR IP): Performed by: NURSE PRACTITIONER

## 2022-06-13 PROCEDURE — 84484 ASSAY OF TROPONIN QUANT: CPT

## 2022-06-13 PROCEDURE — 99285 EMERGENCY DEPT VISIT HI MDM: CPT

## 2022-06-13 PROCEDURE — 83880 ASSAY OF NATRIURETIC PEPTIDE: CPT

## 2022-06-13 PROCEDURE — 2580000003 HC RX 258: Performed by: EMERGENCY MEDICINE

## 2022-06-13 PROCEDURE — 83605 ASSAY OF LACTIC ACID: CPT

## 2022-06-13 RX ORDER — 0.9 % SODIUM CHLORIDE 0.9 %
1000 INTRAVENOUS SOLUTION INTRAVENOUS ONCE
Status: DISCONTINUED | OUTPATIENT
Start: 2022-06-13 | End: 2022-06-15 | Stop reason: HOSPADM

## 2022-06-13 RX ORDER — SODIUM CHLORIDE 0.9 % (FLUSH) 0.9 %
5-40 SYRINGE (ML) INJECTION EVERY 12 HOURS SCHEDULED
Status: DISCONTINUED | OUTPATIENT
Start: 2022-06-13 | End: 2022-06-15 | Stop reason: HOSPADM

## 2022-06-13 RX ORDER — CLONAZEPAM 0.5 MG/1
0.5 TABLET ORAL DAILY PRN
Status: DISCONTINUED | OUTPATIENT
Start: 2022-06-13 | End: 2022-06-14

## 2022-06-13 RX ORDER — LEVOTHYROXINE SODIUM 0.12 MG/1
125 TABLET ORAL DAILY
Status: DISCONTINUED | OUTPATIENT
Start: 2022-06-14 | End: 2022-06-15 | Stop reason: HOSPADM

## 2022-06-13 RX ORDER — ASPIRIN 81 MG/1
81 TABLET ORAL DAILY
Status: DISCONTINUED | OUTPATIENT
Start: 2022-06-14 | End: 2022-06-15 | Stop reason: HOSPADM

## 2022-06-13 RX ORDER — SODIUM CHLORIDE 0.9 % (FLUSH) 0.9 %
5-40 SYRINGE (ML) INJECTION PRN
Status: DISCONTINUED | OUTPATIENT
Start: 2022-06-13 | End: 2022-06-15 | Stop reason: HOSPADM

## 2022-06-13 RX ORDER — SODIUM CHLORIDE 9 MG/ML
INJECTION, SOLUTION INTRAVENOUS PRN
Status: DISCONTINUED | OUTPATIENT
Start: 2022-06-13 | End: 2022-06-15 | Stop reason: HOSPADM

## 2022-06-13 RX ORDER — METOPROLOL SUCCINATE 50 MG/1
50 TABLET, EXTENDED RELEASE ORAL NIGHTLY
Status: DISCONTINUED | OUTPATIENT
Start: 2022-06-13 | End: 2022-06-15 | Stop reason: HOSPADM

## 2022-06-13 RX ORDER — ACETAMINOPHEN 650 MG/1
650 SUPPOSITORY RECTAL EVERY 6 HOURS PRN
Status: DISCONTINUED | OUTPATIENT
Start: 2022-06-13 | End: 2022-06-15 | Stop reason: HOSPADM

## 2022-06-13 RX ORDER — FLUVOXAMINE MALEATE 50 MG/1
100 TABLET, COATED ORAL 2 TIMES DAILY
Status: DISCONTINUED | OUTPATIENT
Start: 2022-06-13 | End: 2022-06-15 | Stop reason: HOSPADM

## 2022-06-13 RX ORDER — SODIUM CHLORIDE 9 MG/ML
INJECTION, SOLUTION INTRAVENOUS CONTINUOUS
Status: DISCONTINUED | OUTPATIENT
Start: 2022-06-13 | End: 2022-06-15 | Stop reason: HOSPADM

## 2022-06-13 RX ORDER — ENOXAPARIN SODIUM 100 MG/ML
40 INJECTION SUBCUTANEOUS DAILY
Status: DISCONTINUED | OUTPATIENT
Start: 2022-06-14 | End: 2022-06-15 | Stop reason: HOSPADM

## 2022-06-13 RX ORDER — ONDANSETRON 2 MG/ML
4 INJECTION INTRAMUSCULAR; INTRAVENOUS ONCE
Status: COMPLETED | OUTPATIENT
Start: 2022-06-13 | End: 2022-06-13

## 2022-06-13 RX ORDER — ACETAMINOPHEN 325 MG/1
650 TABLET ORAL EVERY 6 HOURS PRN
Status: DISCONTINUED | OUTPATIENT
Start: 2022-06-13 | End: 2022-06-15 | Stop reason: HOSPADM

## 2022-06-13 RX ADMIN — IOPAMIDOL 75 ML: 755 INJECTION, SOLUTION INTRAVENOUS at 18:18

## 2022-06-13 RX ADMIN — ONDANSETRON 4 MG: 2 INJECTION INTRAMUSCULAR; INTRAVENOUS at 17:05

## 2022-06-13 RX ADMIN — SODIUM CHLORIDE: 9 INJECTION, SOLUTION INTRAVENOUS at 23:27

## 2022-06-13 RX ADMIN — CEFTRIAXONE 2000 MG: 2 INJECTION, POWDER, FOR SOLUTION INTRAMUSCULAR; INTRAVENOUS at 20:43

## 2022-06-13 RX ADMIN — METOPROLOL SUCCINATE 50 MG: 50 TABLET, EXTENDED RELEASE ORAL at 23:21

## 2022-06-13 RX ADMIN — FLUVOXAMINE MALEATE 100 MG: 50 TABLET, COATED ORAL at 23:21

## 2022-06-13 ASSESSMENT — PAIN - FUNCTIONAL ASSESSMENT: PAIN_FUNCTIONAL_ASSESSMENT: NONE - DENIES PAIN

## 2022-06-13 NOTE — ED PROVIDER NOTES
[facility]      Pt Name: Jenn Barney  MRN: [id number]  [place] 1950  Date of evaluation: 6/13/2022  Provider: Carol Taylor DO    CHIEF COMPLAINT       Chief Complaint   Patient presents with    Medication Reaction     pt has MS, was taken off Zhao [place] in January, which had been working well but because of her the MD was worried about some side effects. They put patient on Aubagio in March and she has been feeling progressively ill. They stopped it last Monday but she was still feeling ill. She was prescribed an eliminator accelerator but she is feeling too bad to take it and is now not on any MS meds. Per  she has been feeling feverish and had chills at home and has been sleeping 18+ hours a day     Nausea         HISTORY OF PRESENT ILLNESS   (Location/Symptom, Timing/Onset, Context/Setting, Quality, Duration, Modifying Factors, Severity)  Note limiting factors. Jenn Barney is a 66 y.o. female who presents to the emergency department with a complaint of nausea, diarrhea, loss of appetite, and low-grade fever. She states that she initially started feeling poorly approximately 7 to 8 days ago. Her last fever was 100.0 on Wednesday, 5 days ago. She reports nausea and loss of appetite. However, she denies any vomiting. She had multiple episodes of brown watery diarrhea last week. Diarrhea has resolved since Friday with no further episodes. However, she has had persistent abdominal pain described as generalized soreness of the abdomen throughout her illness. She also reports a mild to moderate bifrontal headache. She denies any neck pain or stiffness. She denies any sinus drainage earache sore throat, cough or cold symptoms. She denies any chest pain or shortness of breath. She denies any exposure to illness. No recent travel. She did take a home COVID test 2 days ago which was negative.     The patient has a history of multiple sclerosis that has manifested itself and generalized weakness. She normally ambulates with a cane. She is also had urological issues with her multiple sclerosis and required a bladder stimulator. She denies any current dysuria hematuria frequency urgency. She does report some chronic urinary retention and incontinence. She denies any hematuria. She denies any melena or hematochezia. She was previously doing well on Tasabri for multiple sclerosis. She was changed to Aubagio in March. She suspects that her symptoms could be secondary to that medication. She stopped taking it 2 weeks ago. However, she states that she feels worse after discontinuing the medication. She does report decreased urine output and decreased oral intake over the last week. Nursing Notes were reviewed. HPI        REVIEW OF SYSTEMS    (2-9 systems for level 4, 10 or more for level 5)         HENT: Negative for rhinorrhea and sore throat. Eyes: Negative for redness or drainage. Respiratory: Negative for shortness of breath or dyspnea on exertion. Cardiovascular: Negative for chest pain. Musculoskeletal:  Negative edema. Hematological: Negative for adenopathy. All systems are reviewed and are negative except for those listed above in the history of present illness and ROS.         PAST MEDICAL HISTORY     Past Medical History:   Diagnosis Date    Anxiety     Hypertension     Multiple sclerosis ([address]     OCD (obsessive compulsive disorder)     Osteopenia     Syncopal episodes     Thyroid disease          SURGICAL HISTORY       Past Surgical History:   Procedure Laterality Date     SECTION      COLONOSCOPY      COLONOSCOPY N/A 2020    COLONOSCOPY DIAGNOSTIC performed by Syd Mcnally MD at [address]       Previous Medications    ASPIRIN 81 MG EC TABLET    Take 81 mg by mouth daily    CALCIUM CITRATE (CALCITRATE) 950 MG TABLET    Take 1 tablet by mouth 2 times daily     CHOLECALCIFEROL (VITAMIN D) 2000 UNITS CAPS CAPSULE    Take 4,000 capsules by mouth daily    CLONAZEPAM (KLONOPIN) 0.5 MG TABLET    Take 0.5 mg by mouth daily as needed for Anxiety. CLONAZEPAM (KLONOPIN) 1 MG TABLET    Take 1 mg by mouth daily as needed for Anxiety. FLUVOXAMINE (LUVOX) 100 MG TABLET    Take 100 mg by mouth 2 times daily    LEVOTHYROXINE (SYNTHROID) 125 MCG TABLET    Take 125 mcg by mouth Daily    METOPROLOL SUCCINATE (TOPROL XL) 50 MG EXTENDED RELEASE TABLET    Take 50 mg by mouth nightly    NATALIZUMAB (TYSABRI) 300 MG/15ML INJECTION    Infuse intravenously every 28 days    OMEGA-3 FATTY ACIDS (FISH OIL) 1000 MG CAPS    Take 1,000 mg by mouth daily    POLYETHYLENE GLYCOL (GLYCOLAX) 17 GM/SCOOP POWDER    Take 17 g by mouth every morning       ALLERGIES     Sulfa antibiotics    FAMILY HISTORY       Family History   Problem Relation Age of Onset    Stroke Mother     Other Father         algerald          SOCIAL HISTORY       Social History     Socioeconomic History    Marital status:      Spouse name: None    Number of children: None    Years of education: None    Highest education level: None   Occupational History    None   Tobacco Use    Smoking status: Former Smoker    Smokeless tobacco: Never Used   Substance and Sexual Activity    Alcohol use: No    Drug use: No    Sexual activity: None   Other Topics Concern    None   Social History Narrative    None     Social Determinants of Health     Financial Resource Strain:     Difficulty of Paying Living Expenses: Not on file   Food Insecurity:     Worried About Running Out of Food in the Last Year: Not on file    Jeannette of Food in the Last Year: Not on file   Transportation Needs:     Lack of Transportation (Medical): Not on file    Lack of Transportation (Non-Medical):  Not on file   Physical Activity:     Days of Exercise per Week: Not on file    Minutes of Exercise per Session: Not on file   Stress:     Feeling of Stress : Not on file   Social Connections:     Frequency of Communication with Friends and Family: Not on file    Frequency of Social Gatherings with Friends and Family: Not on file    Attends Restorationist Services: Not on file    Active Member of [address] [place] [address] [employer] or Organizations: Not on file    Attends Club or Organization Meetings: Not on file    Marital Status: Not on file   Intimate Partner Violence:     Fear of Current or Ex-Partner: Not on file    Emotionally Abused: Not on file    Physically Abused: Not on file    Sexually Abused: Not on file   Housing Stability:     Unable to Pay for Housing in the Last Year: Not on file    Number of [place] in the Last Year: Not on file    Unstable Housing in the Last Year: Not on file       SCREENINGS    [place] Coma Scale  Eye Opening: Spontaneous  Best Verbal Response: Oriented  Best Motor Response: Obeys commands  [place] Coma Scale Score: 15        PHYSICAL EXAM    (up to 7 for level 4, 8 or more for level 5)     ED Triage Vitals [06/13/22 1615]   BP Temp Temp Source Heart Rate Resp SpO2 Height Weight   122/88 98.9 °F (37.2 °C) Oral (!) 120 18 96 % -- --         Physical Exam   Constitutional: Awake and alert. Slightly pale. Generally weak. Head: No visible evidence of trauma. Normocephalic. Eyes: Pupils equal and reactive. No photophobia. Conjunctiva normal.    HENT: Oral mucosa moist.  Airway patent. Pharynx without erythema. Nares were clear. Neck:  Soft and supple. Nontender. Heart: Minus tachycardia on the monitor. No murmur. Lungs:  Clear to auscultation. No wheezes, rales, or ronchi. No conversational dyspnea or accessory muscle use. Chest: Chest wall non-tender. No evidence of trauma. Abdomen:  Soft, nondistended, bowel sounds present.   Localizing tenderness but there is generalized abdominal discomfort with palpation throughout the abdomen, mostly in the lower abdomen. No guarding rigidity or rebound. No masses. Musculoskeletal: Extremities non-tender with full range of motion. Radial and dorsalis pedis pulses were intact. No calf tenderness erythema or edema. Neurological: Alert and oriented x 3. No dysarthria. No aphasia. No pronator drift. Speech clear. Cranial nerves II-XII intact. No facial droop. No acute focal motor or sensory deficits. Able to lift all extremities off the bed without difficulty. Skin: Skin is warm and dry. No rash. Lymphatic:  No lympadenopathy. Psychiatric: Normal mood and affect. Behavior is normal.         DIAGNOSTIC RESULTS     EKG: All EKG's are interpreted by the Emergency Department Physician who either signs or Co-signs this chart in the absence of a cardiologist.    Sinus tachycardia. Rate 103. SC interval 166 ms. QRS duration 64 ms. QTc 403 ms. R axis I 104 degrees. Q waves noted in the anterior septal leads. No ST elevation. EKG is identical to a previous tracing from July 4, 2021. RADIOLOGY:   Non-plain film images such as CT, Ultrasound and MRI are read by the radiologist. Plain radiographic images are visualized and preliminarily interpreted by the emergency physician with the below findings:        Interpretation per the Radiologist below, if available at the time of this note:    CT HEAD WO CONTRAST   Final Result   Mild atrophy and mild chronic microischemic changes scattered the deep white   matter with no acute intracranial abnormality seen. CT ABDOMEN PELVIS W IV CONTRAST Additional Contrast? None   Final Result   Findings suspicious for mild acute pyelonephritis in the right kidney with   vague areas of probable lobar nephronia along the cortex laterally and   inferiorly with no obvious enhancing mass seen. No hydronephrosis or renal   stones are seen. Recommend correlating with a urinalysis and suggest   urological consultation and short-term follow-up to assure resolution. Cholelithiasis with mild hydrops of the gallbladder. Mild bibasilar atelectasis or early infiltrates      Borderline hepatosplenomegaly. Probable 6 mm cyst left lobe liver which is too small to further characterize. Atrophic uterus with no pelvic mass      Diffuse mild bladder wall thickening which may just be due to poor   distention. Recommend correlating with a urinalysis. XR CHEST PORTABLE   Final Result   No acute process.                ED BEDSIDE ULTRASOUND:   Performed by ED Physician - none    LABS:  Labs Reviewed   CBC WITH AUTO DIFFERENTIAL - Abnormal; Notable for the following components:       Result Value    Lymphocytes Absolute 0.4 (*)     All other components within normal limits   URINALYSIS WITH REFLEX TO CULTURE - Abnormal; Notable for the following components:    Clarity, UA CLOUDY (*)     Blood, Urine TRACE (*)     Protein, UA 30 (*)     Leukocyte Esterase, Urine MODERATE (*)     All other components within normal limits   COMPREHENSIVE METABOLIC PANEL W/ REFLEX TO MG FOR LOW K - Abnormal; Notable for the following components:    Chloride 98 (*)     Glucose 151 (*)     BUN 29 (*)     CREATININE 1.4 (*)     GFR Non- 37 (*)     GFR  45 (*)     Total Protein 6.3 (*)     Albumin 3.1 (*)     Albumin/Globulin Ratio 1.0 (*)     All other components within normal limits   BRAIN NATRIURETIC PEPTIDE - Abnormal; Notable for the following components:    Pro-BNP 2,053 (*)     All other components within normal limits   MICROSCOPIC URINALYSIS - Abnormal; Notable for the following components:    Bacteria, UA 4+ (*)     WBC, UA 74 (*)     Epithelial Cells, UA 6 (*)     All other components within normal limits   CULTURE, BLOOD 2   CULTURE, BLOOD 1   RAPID INFLUENZA A/B ANTIGENS   CULTURE, URINE   LACTATE, SEPSIS   TROPONIN   LACTATE, SEPSIS   LACTATE, SEPSIS   BLOOD GAS, VENOUS   COVID-19   COVID-19   COVID-19   COVID-19   LACTATE, SEPSIS   BASIC METABOLIC PANEL W/ REFLEX TO MG FOR LOW K   CBC WITH AUTO DIFFERENTIAL       All other labs were within normal range or not returned as of this dictation. EMERGENCY DEPARTMENT COURSE and DIFFERENTIAL DIAGNOSIS/MDM:   Vitals:    Vitals:    06/13/22 1615 06/13/22 2031   BP: 122/88 (!) 152/47   Pulse: (!) 120 100   Resp: 18 16   Temp: 98.9 °F (37.2 °C) 99 °F (37.2 °C)   TempSrc: Oral Oral   SpO2: 96% 93%         MDM      The patient presents to the emergency department with a complaint of nausea, abdominal pain, loss of appetite, diarrhea, decreased oral intake, and generally feeling poorly. She is currently afebrile. She is tachycardic. Heart rate is 120. She does have diffuse abdominal discomfort to palpation but no localizing tenderness. She was given normal saline 1 L bolus and Zofran 4 mg IV. She was sent for CT abdomen and pelvis with IV contrast.    Because she does report a headache that is been present for the last week she was sent for CT head without contrast.  There is no neck pain or stiffness and no photophobia. She is neurologically intact. No recent trauma. Frontal diagnosis would include viral syndrome, dehydration, colitis, diverticulitis, etc.      REASSESSMENT          1:40 PM: Urinalysis reveals moderate leukocytes, 4+ bacteria, 74 white cells. She was given Rocephin 2 g IV. White blood cell count is normal.  Neutrophil count is normal.  CT head reveals no acute findings. CT abdomen and pelvis with IV contrast reveals evidence of acute pyelonephritis in the right kidney. No evidence of hydronephrosis or ureteral stone. There is cholelithiasis but no evidence of cholecystitis. Bibasilar atelectasis noted. She is mildly tachycardic. Heart rate is now 100 after 1 L of IV fluids. I suspect that she was somewhat volume depleted. BUN is 29 with a creatinine of 1.4 indicating some degree of dehydration. Lactate is normal at 1.5. No evidence of hypotension.     Patient will be admitted for further treatment and evaluation. A call was placed to the hospitalist on-call for admission. CRITICAL CARE TIME   Total Critical Care time was 0 minutes, excluding separately reportable procedures. There was a high probability of clinically significant/life threatening deterioration in the patient's condition which required my urgent intervention. CONSULTS:  None    PROCEDURES:  Unless otherwise noted below, none     Procedures        FINAL IMPRESSION      1. Acute pyelonephritis          DISPOSITION/PLAN   DISPOSITION        PATIENT REFERRED TO:  No follow-up provider specified. DISCHARGE MEDICATIONS:  New Prescriptions    No medications on file     Controlled Substances Monitoring:     No flowsheet data found. (Please note that portions of this note were completed with a voice recognition program.  Efforts were made to edit the dictations but occasionally words are mis-transcribed. )    [id number] Ch[address] Griffin DO (electronically signed)  Attending Emergency Physician          Sierra Canales DO  06/13/22 7988

## 2022-06-14 LAB
ANION GAP SERPL CALCULATED.3IONS-SCNC: 9 MMOL/L (ref 3–16)
BASE EXCESS VENOUS: 4.8 MMOL/L
BASOPHILS ABSOLUTE: 0 K/UL (ref 0–0.2)
BASOPHILS RELATIVE PERCENT: 0.4 %
BUN BLDV-MCNC: 21 MG/DL (ref 7–20)
CALCIUM SERPL-MCNC: 7.9 MG/DL (ref 8.3–10.6)
CARBOXYHEMOGLOBIN: 1.1 %
CHLORIDE BLD-SCNC: 103 MMOL/L (ref 99–110)
CO2: 24 MMOL/L (ref 21–32)
CREAT SERPL-MCNC: 1 MG/DL (ref 0.6–1.2)
EKG ATRIAL RATE: 103 BPM
EKG DIAGNOSIS: NORMAL
EKG P AXIS: 59 DEGREES
EKG P-R INTERVAL: 166 MS
EKG Q-T INTERVAL: 308 MS
EKG QRS DURATION: 64 MS
EKG QTC CALCULATION (BAZETT): 403 MS
EKG R AXIS: 104 DEGREES
EKG T AXIS: 56 DEGREES
EKG VENTRICULAR RATE: 103 BPM
EOSINOPHILS ABSOLUTE: 0.1 K/UL (ref 0–0.6)
EOSINOPHILS RELATIVE PERCENT: 1 %
GFR AFRICAN AMERICAN: >60
GFR NON-AFRICAN AMERICAN: 54
GLUCOSE BLD-MCNC: 98 MG/DL (ref 70–99)
HCO3 VENOUS: 30 MMOL/L (ref 23–29)
HCT VFR BLD CALC: 33 % (ref 36–48)
HEMOGLOBIN: 11.1 G/DL (ref 12–16)
LACTIC ACID, SEPSIS: 0.8 MMOL/L (ref 0.4–1.9)
LACTIC ACID, SEPSIS: 1.6 MMOL/L (ref 0.4–1.9)
LYMPHOCYTES ABSOLUTE: 0.8 K/UL (ref 1–5.1)
LYMPHOCYTES RELATIVE PERCENT: 12.8 %
MCH RBC QN AUTO: 32.1 PG (ref 26–34)
MCHC RBC AUTO-ENTMCNC: 33.6 G/DL (ref 31–36)
MCV RBC AUTO: 95.5 FL (ref 80–100)
METHEMOGLOBIN VENOUS: 0.8 %
MONOCYTES ABSOLUTE: 0.5 K/UL (ref 0–1.3)
MONOCYTES RELATIVE PERCENT: 8.9 %
NEUTROPHILS ABSOLUTE: 4.7 K/UL (ref 1.7–7.7)
NEUTROPHILS RELATIVE PERCENT: 76.9 %
O2 SAT, VEN: 94 %
O2 THERAPY: ABNORMAL
PCO2, VEN: 43.3 MMHG (ref 40–50)
PDW BLD-RTO: 13.3 % (ref 12.4–15.4)
PH VENOUS: 7.44 (ref 7.35–7.45)
PLATELET # BLD: 157 K/UL (ref 135–450)
PMV BLD AUTO: 7.8 FL (ref 5–10.5)
PO2, VEN: 68 MMHG
POTASSIUM REFLEX MAGNESIUM: 4.3 MMOL/L (ref 3.5–5.1)
RAPID INFLUENZA  B AGN: NEGATIVE
RAPID INFLUENZA A AGN: NEGATIVE
RBC # BLD: 3.46 M/UL (ref 4–5.2)
SARS-COV-2, PCR: NOT DETECTED
SODIUM BLD-SCNC: 136 MMOL/L (ref 136–145)
TCO2 CALC VENOUS: 31 MMOL/L
WBC # BLD: 6.1 K/UL (ref 4–11)

## 2022-06-14 PROCEDURE — 94760 N-INVAS EAR/PLS OXIMETRY 1: CPT

## 2022-06-14 PROCEDURE — 6370000000 HC RX 637 (ALT 250 FOR IP): Performed by: NURSE PRACTITIONER

## 2022-06-14 PROCEDURE — 6360000002 HC RX W HCPCS: Performed by: NURSE PRACTITIONER

## 2022-06-14 PROCEDURE — 85025 COMPLETE CBC W/AUTO DIFF WBC: CPT

## 2022-06-14 PROCEDURE — U0005 INFEC AGEN DETEC AMPLI PROBE: HCPCS

## 2022-06-14 PROCEDURE — 93010 ELECTROCARDIOGRAM REPORT: CPT | Performed by: INTERNAL MEDICINE

## 2022-06-14 PROCEDURE — U0003 INFECTIOUS AGENT DETECTION BY NUCLEIC ACID (DNA OR RNA); SEVERE ACUTE RESPIRATORY SYNDROME CORONAVIRUS 2 (SARS-COV-2) (CORONAVIRUS DISEASE [COVID-19]), AMPLIFIED PROBE TECHNIQUE, MAKING USE OF HIGH THROUGHPUT TECHNOLOGIES AS DESCRIBED BY CMS-2020-01-R: HCPCS

## 2022-06-14 PROCEDURE — 2580000003 HC RX 258: Performed by: NURSE PRACTITIONER

## 2022-06-14 PROCEDURE — 80048 BASIC METABOLIC PNL TOTAL CA: CPT

## 2022-06-14 PROCEDURE — 83605 ASSAY OF LACTIC ACID: CPT

## 2022-06-14 PROCEDURE — 1200000000 HC SEMI PRIVATE

## 2022-06-14 PROCEDURE — 82803 BLOOD GASES ANY COMBINATION: CPT

## 2022-06-14 PROCEDURE — 87804 INFLUENZA ASSAY W/OPTIC: CPT

## 2022-06-14 PROCEDURE — 36415 COLL VENOUS BLD VENIPUNCTURE: CPT

## 2022-06-14 RX ORDER — CLONAZEPAM 0.5 MG/1
0.5 TABLET ORAL
Status: DISCONTINUED | OUTPATIENT
Start: 2022-06-14 | End: 2022-06-15 | Stop reason: HOSPADM

## 2022-06-14 RX ADMIN — FLUVOXAMINE MALEATE 100 MG: 50 TABLET, COATED ORAL at 21:38

## 2022-06-14 RX ADMIN — ENOXAPARIN SODIUM 40 MG: 100 INJECTION SUBCUTANEOUS at 08:12

## 2022-06-14 RX ADMIN — FLUVOXAMINE MALEATE 100 MG: 50 TABLET, COATED ORAL at 08:13

## 2022-06-14 RX ADMIN — CEFTRIAXONE 1000 MG: 1 INJECTION, POWDER, FOR SOLUTION INTRAMUSCULAR; INTRAVENOUS at 17:12

## 2022-06-14 RX ADMIN — LEVOTHYROXINE SODIUM 125 MCG: 0.12 TABLET ORAL at 06:20

## 2022-06-14 RX ADMIN — METOPROLOL SUCCINATE 50 MG: 50 TABLET, EXTENDED RELEASE ORAL at 21:38

## 2022-06-14 RX ADMIN — SODIUM CHLORIDE: 9 INJECTION, SOLUTION INTRAVENOUS at 08:22

## 2022-06-14 RX ADMIN — ASPIRIN 81 MG: 81 TABLET, COATED ORAL at 08:13

## 2022-06-14 RX ADMIN — SODIUM CHLORIDE: 9 INJECTION, SOLUTION INTRAVENOUS at 21:38

## 2022-06-14 NOTE — CARE COORDINATION
INITIAL CASE MANAGEMENT ASSESSMENT     Reviewed chart, met with patient to assess possible discharge needs. Explained Case Management role/services. SW interviewed patient via telephone today as she is currently placed in droplet/plus precautions.     Living Situation: Patient resides in a single family home with her   and two small dogs. There are three stairs leading up to the front door. Prior to medical admission patient reports that she had no trouble getting in and out of the property.     ADLs: Prior to medical admission patient reports that she was independent. She stated that she doesn't anticipate any needs at discharge.     DME: Prior to medical admission patient reports that she used a cane and a walker depending on her needs. She stated that she doesn't anticipate any needs at discharge.      PT/OT Recs: Not ordered.      Active Services: Prior to medical admission patient reports that she had no active services in place. She stated that she doesn't anticipate any needs at discharge.     Transportation: Prior to medical admission patient reports that she was an active . She stated that her  will likely transport her home at discharge.     Medications: Patient receives medications from [address] [place] [address]. At this time she reports no issues with access or affordability.     PCP: Whitney Archibald [phone] (phone) and [phone] (fax number). Patient reports that her last appointment with this provider was over a week ago.   Precious Noonan  HD/PD: N/A     PLAN/COMMENTS:   1) Monitor for therapy needs. No other disciplines are following this patient as of yet, we are waiting on COVID test results.      SW provided contact information for patient or family to call with any questions. SW will follow and assist as needed.     Respectfully submitted,     YESSICA Ramirez  [facility]   [phone]    Electronically signed by YESSICA Alvarez on 6/14/2022 at 8:55 AM

## 2022-06-14 NOTE — ACP (ADVANCE CARE PLANNING)
Advance Care Planning     Advance Care Planning Activator (Inpatient)  Conversation Note      Date of ACP Conversation: 6/14/2022     Conversation Conducted with: Patient with Decision Making Capacity    ACP Activator: YESSICA Block    Health Care Decision Maker:     Current Designated Health Care Decision Maker:     Primary Decision Maker: Lino Willns  [phone]    Care Preferences    Ventilation: \"If you were in your present state of health and suddenly became very ill and were unable to breathe on your own, what would your preference be about the use of a ventilator (breathing machine) if it were available to you? \"      Would the patient desire the use of ventilator (breathing machine)?: yes    \"If your health worsens and it becomes clear that your chance of recovery is unlikely, what would your preference be about the use of a ventilator (breathing machine) if it were available to you? \"     Would the patient desire the use of ventilator (breathing machine)?: No      Resuscitation  \"CPR works best to restart the heart when there is a sudden event, like a heart attack, in someone who is otherwise healthy. Unfortunately, CPR does not typically restart the heart for people who have serious health conditions or who are very sick. \"     \"In the event your heart stopped as a result of an underlying serious health condition, would you want attempts to be made to restart your heart (answer \"yes\" for attempt to resuscitate) or would you prefer a natural death (answer \"no\" for do not attempt to resuscitate)? \" yes       [] Yes   [] No   Educated Patient / Tia Getting regarding differences between Advance Directives and portable DNR orders.     Length of ACP Conversation in minutes:  2    Conversation Outcomes:  [x] ACP discussion completed  [] Existing advance directive reviewed with patient; no changes to patient's previously recorded wishes  [] New Advance Directive completed  [] Portable Do Not Rescitate prepared for Provider review and signature  [] POLST/POST/MOLST/MOST prepared for Provider review and signature      Follow-up plan:    [] Schedule follow-up conversation to continue planning  [] Referred individual to Provider for additional questions/concerns   [] Advised patient/agent/surrogate to review completed ACP document and update if needed with changes in condition, patient preferences or care setting    [x] This note routed to one or more involved healthcare providers . Keyona Galicia MD primary care physician. Respectfully submitted,    TASHA Headley-S  [facility]   [phone]    Electronically signed by YESSICA Carranza on 6/14/2022 at 8:58 AM

## 2022-06-14 NOTE — H&P
Hospital Medicine History & Physical      PCP: Valerie Hansen MD    Date of Admission: 2022    Date of Service: Pt seen/examined on 2022 and Admitted to Inpatient with expected LOS greater than two midnights due to medical therapy. Chief Complaint: Fevers, nausea      History Of Present Illness:      66 y.o. female with PMHx of hypothyroidism, OCD, anxiety, hypertension and multiple sclerosis presented to [facility] emergency department with a 1 week complaint of myalgias, fevers, severe nausea and diarrhea. She has not vomited. She has multiple sclerosis and thought her symptoms were related to her MS medication however the body aches and nausea became so severe that she came through the emergency department today. It was noted that she had tachycardia, abdominal pain and urinary tract infection/pyelonephritis. She denies dysuria or hematuria but states that she was incontinent of diarrhea due to the severity of it. She denies recent antibiotic use. She states her T-max was 99.8 yesterday. Last diarrhea episode was yesterday. Past Medical History:          Diagnosis Date    Anxiety     Hypertension     Multiple sclerosis ([address]     OCD (obsessive compulsive disorder)     Osteopenia     Syncopal episodes     Thyroid disease        Past Surgical History:          Procedure Laterality Date     SECTION      COLONOSCOPY      COLONOSCOPY N/A 2020    COLONOSCOPY DIAGNOSTIC performed by Mindi Herrera MD at [address] [facility]         Medications Prior to Admission:      Prior to Admission medications    Medication Sig Start Date End Date Taking?  Authorizing Provider   Omega-3 Fatty Acids (FISH OIL) 1000 MG CAPS Take 1,000 mg by mouth daily    Historical Provider, MD   polyethylene glycol (GLYCOLAX) 17 GM/SCOOP powder Take 17 g by mouth every morning    Historical Provider, MD   aspirin 81 MG EC tablet Take 81 mg by mouth daily    Historical Provider, MD   levothyroxine (SYNTHROID) 125 MCG tablet Take 125 mcg by mouth Daily    Historical Provider, MD   fluvoxaMINE (LUVOX) 100 MG tablet Take 100 mg by mouth 2 times daily    Historical Provider, MD   natalizumab (TYSABRI) 300 MG/15ML injection Infuse intravenously every 28 days    Historical Provider, MD   metoprolol succinate (TOPROL XL) 50 MG extended release tablet Take 50 mg by mouth nightly    Historical Provider, MD   clonazePAM (KLONOPIN) 0.5 MG tablet Take 0.5 mg by mouth daily as needed for Anxiety. Historical Provider, MD   calcium citrate (CALCITRATE) 950 MG tablet Take 1 tablet by mouth 2 times daily     Historical Provider, MD   Cholecalciferol (VITAMIN D) 2000 units CAPS capsule Take 4,000 capsules by mouth daily    Historical Provider, MD   clonazePAM (KLONOPIN) 1 MG tablet Take 1 mg by mouth daily as needed for Anxiety. 3/13/15   Historical Provider, MD       Allergies:  Sulfa antibiotics    Social History:      The patient currently lives at home with her . TOBACCO:   reports that she has quit smoking. She has never used smokeless tobacco.  ETOH:   reports no history of alcohol use. Family History:      Reviewed in detail positive as follows:        Problem Relation Age of Onset    Stroke Mother     Other Father         alheizmers       REVIEW OF SYSTEMS:   Pertinent positives as noted in the HPI. All other systems reviewed and negative. PHYSICAL EXAM PERFORMED:    BP (!) 152/47   Pulse 100   Temp 99 °F (37.2 °C) (Oral)   Resp 16   SpO2 93%     General appearance:  Well developed, well nourished, in no apparent distress, appears stated age and cooperative. HEENT:  Normal cephalic, atraumatic without obvious deformity. Pupils equal, round, and reactive to light. Conjunctivae/corneas clear. Neck: Supple, with full range of motion. No jugular venous distention. Trachea midline. Respiratory:  Normal respiratory effort.  Clear to auscultation, bilaterally without accessory muscle use. Cardiovascular:  Regular rate and rhythm without murmurs, no lower extremity edema. Abdomen: Soft, non-tender, non-distended, without rebound or guarding. Normal bowel sounds. Musculoskeletal:  Moves all extremities equally. Full range of motion without deformity. Skin: Skin warm, dry and intact. No rashes or lesions. Neurologic:  Neurovascularly intact without any focal sensory/motor deficits. Cranial nerves: II-XII intact, grossly non-focal.  Psychiatric:  Alert and oriented, thought content appropriate, normal insight  Capillary Refill: Brisk,< 3 seconds   Peripheral Pulses: +2 palpable, equal bilaterally       Labs:     Recent Labs     06/13/22  1700   WBC 6.5   HGB 12.7   HCT 38.1        Recent Labs     06/13/22  1700      K 4.2   CL 98*   CO2 27   BUN 29*   CREATININE 1.4*   CALCIUM 8.7     Recent Labs     06/13/22  1700   AST 18   ALT 13   BILITOT 0.3   ALKPHOS 110     No results for input(s): INR in the last 72 hours. Recent Labs     06/13/22  1700   TROPONINI <0.01       Urinalysis:      Lab Results   Component Value Date    NITRU Negative 06/13/2022    WBCUA 74 06/13/2022    BACTERIA 4+ 06/13/2022    RBCUA 1 06/13/2022    BLOODU TRACE 06/13/2022    SPECGRAV 1.019 06/13/2022    GLUCOSEU Negative 06/13/2022       Radiology:     EKG:  I have reviewed the EKG with the following interpretation:   Ventricular rate 103 bpm, WY interval 166 ms, QRS duration 64 ms, QTC 4 3 ms  No ST elevation or depression. T wave inversion in V2. Interpretation is a sinus tachycardia with PACs    CT HEAD WO CONTRAST   Final Result   Mild atrophy and mild chronic microischemic changes scattered the deep white   matter with no acute intracranial abnormality seen.          CT ABDOMEN PELVIS W IV CONTRAST Additional Contrast? None   Final Result   Findings suspicious for mild acute pyelonephritis in the right kidney with   vague areas of probable lobar nephronia along the cortex laterally and   inferiorly with no obvious enhancing mass seen. No hydronephrosis or renal   stones are seen. Recommend correlating with a urinalysis and suggest   urological consultation and short-term follow-up to assure resolution. Cholelithiasis with mild hydrops of the gallbladder. Mild bibasilar atelectasis or early infiltrates      Borderline hepatosplenomegaly. Probable 6 mm cyst left lobe liver which is too small to further characterize. Atrophic uterus with no pelvic mass      Diffuse mild bladder wall thickening which may just be due to poor   distention. Recommend correlating with a urinalysis. XR CHEST PORTABLE   Final Result   No acute process. ASSESSMENT:    Active Hospital Problems    Diagnosis Date Noted    Pyelonephritis [N12] 06/13/2022     Priority: Medium    OCD (obsessive compulsive disorder) [F42.9] 06/13/2022     Priority: Medium    Hypothyroid [E03.9] 06/13/2022     Priority: Medium    Anxiety [F41.9] 06/13/2022     Priority: Medium    HTN (hypertension) [I10] 06/13/2022     Priority: Medium    Multiple sclerosis ([facility] [address] Melvinia Running 06/13/2022     Priority: Medium    Sepsis ([address] [A41.9] 07/04/2021         PLAN:    Pyelonephritis  -Afebrile here  -WBC 6.5  -Lactic acid 1.5  -Received 1 L NS in ED  -was tachycardic in ED but resolved after IVF bolus  -BCX x2 drawn in ED  -Continue Rocephin  -IVF at a rate overnight  -Not currently having pain  -Zofran prn nausea  -Strict I&O    WILMA  -Creatinine 1.4 (was 0.9 last year)  -Trend renal  -NS IVF  -Strict I&O    Hypothyroidism  -Continue synthroid    Hypertension  -Continue metoprolol    Anxiety  -Continue Klonopin prn    Multiple sclerosis  -Monitor for exacerbation    OCD  -Continue Luvox      DVT Prophylaxis: lovenox  Diet: ADULT DIET;  Regular  Code Status: Full Code    PT/OT Eval Status: deferred    Dispo - Admission       Everett Rodgers, APRN - CNP    Thank you Patrice Benjamin Briseyda Huynh MD for the opportunity to be involved in this patient's care. If you have any questions or concerns please feel free to contact me at [phone].

## 2022-06-14 NOTE — PROGRESS NOTES
Hospitalist Progress Note      PCP: Ashby Severance, MD    Date of Admission: 6/13/2022    Chief Complaint: fever, nausea. Hospital Course:    66 y.o. female with PMHx of hypothyroidism, OCD, anxiety, hypertension and multiple sclerosis presented to [facility] emergency department with a 1 week complaint of myalgias, fevers, severe nausea and diarrhea. She has not vomited. She has multiple sclerosis and thought her symptoms were related to her MS medication however the body aches and nausea became so severe that she came through the emergency department today. It was noted that she had tachycardia, abdominal pain and urinary tract infection/pyelonephritis    Subjective:    Ported improved symptoms, continues to be afebrile vitally stable      Medications:  Reviewed    Infusion Medications    sodium chloride      sodium chloride 100 mL/hr at 06/14/22 0761     Scheduled Medications    sodium chloride  1,000 mL IntraVENous Once    sodium chloride flush  5-40 mL IntraVENous 2 times per day    enoxaparin  40 mg SubCUTAneous Daily    cefTRIAXone (ROCEPHIN) IV  1,000 mg IntraVENous Q24H    aspirin  81 mg Oral Daily    fluvoxaMINE  100 mg Oral BID    levothyroxine  125 mcg Oral Daily    metoprolol succinate  50 mg Oral Nightly     PRN Meds: clonazePAM, sodium chloride flush, sodium chloride, acetaminophen **OR** acetaminophen      Intake/Output Summary (Last 24 hours) at 6/14/2022 0829  Last data filed at 6/14/2022 0816  Gross per 24 hour   Intake 856 ml   Output --   Net 856 ml       Physical Exam Performed:    /66   Pulse 59   Temp 98.3 °F (36.8 °C) (Oral)   Resp 18   Wt 165 lb 1.6 oz (74.9 kg)   SpO2 94%   BMI 27.47 kg/m²     General appearance: No apparent distress, appears stated age and cooperative. HEENT: Pupils equal, round, and reactive to light. Conjunctivae/corneas clear. Neck: Supple, with full range of motion. No jugular venous distention. Trachea midline.   Respiratory: Normal respiratory effort. Clear to auscultation, bilaterally without Rales/Wheezes/Rhonchi. Cardiovascular: Regular rate and rhythm with normal S1/S2 without murmurs, rubs or gallops. Abdomen: Soft, non-tender, non-distended with normal bowel sounds. Musculoskeletal: No clubbing, cyanosis or edema bilaterally. Full range of motion without deformity. Skin: Skin color, texture, turgor normal.  No rashes or lesions. Neurologic:  Neurovascularly intact without any focal sensory/motor deficits. Cranial nerves: II-XII intact, grossly non-focal.  Psychiatric: Alert and oriented, thought content appropriate, normal insight  Capillary Refill: Brisk,3 seconds, normal   Peripheral Pulses: +2 palpable, equal bilaterally       Labs:   Recent Labs     06/13/22  1700   WBC 6.5   HGB 12.7   HCT 38.1        Recent Labs     06/13/22  1700      K 4.2   CL 98*   CO2 27   BUN 29*   CREATININE 1.4*   CALCIUM 8.7     Recent Labs     06/13/22  1700   AST 18   ALT 13   BILITOT 0.3   ALKPHOS 110     No results for input(s): INR in the last 72 hours. Recent Labs     06/13/22  1700   TROPONINI <0.01       Urinalysis:      Lab Results   Component Value Date    NITRU Negative 06/13/2022    WBCUA 74 06/13/2022    BACTERIA 4+ 06/13/2022    RBCUA 1 06/13/2022    BLOODU TRACE 06/13/2022    SPECGRAV 1.019 06/13/2022    GLUCOSEU Negative 06/13/2022       Radiology:  CT HEAD WO CONTRAST   Final Result   Mild atrophy and mild chronic microischemic changes scattered the deep white   matter with no acute intracranial abnormality seen. CT ABDOMEN PELVIS W IV CONTRAST Additional Contrast? None   Final Result   Findings suspicious for mild acute pyelonephritis in the right kidney with   vague areas of probable lobar nephronia along the cortex laterally and   inferiorly with no obvious enhancing mass seen. No hydronephrosis or renal   stones are seen.   Recommend correlating with a urinalysis and suggest   urological consultation and short-term follow-up to assure resolution. Cholelithiasis with mild hydrops of the gallbladder. Mild bibasilar atelectasis or early infiltrates      Borderline hepatosplenomegaly. Probable 6 mm cyst left lobe liver which is too small to further characterize. Atrophic uterus with no pelvic mass      Diffuse mild bladder wall thickening which may just be due to poor   distention. Recommend correlating with a urinalysis. XR CHEST PORTABLE   Final Result   No acute process. Assessment/Plan:    Active Hospital Problems    Diagnosis     Pyelonephritis [N12]      Priority: Medium    OCD (obsessive compulsive disorder) [F42.9]      Priority: Medium    Hypothyroid [E03.9]      Priority: Medium    Anxiety [F41.9]      Priority: Medium    HTN (hypertension) [I10]      Priority: Medium    Multiple sclerosis ([facility] [address] [G35]      Priority: Medium    WILMA (acute kidney injury) ([facility] [address] [N17.9]      Priority: Medium    Sepsis ([facility] [address] [A41.9]      Acute right pyelonephritis. Nephronia. Patient presented to emergency with fever, nausea, was found to have a urinary tract infection, CT scan showed evidence of acute pyelonephritis with nephronia, patient clinically improving on IV antibiotics. Plan.    -continue on ceftriaxone.  -Follow urine culture.  -Follow blood culture. -Regarding nephronia, if patient continues to improve clinically, will refer to urology as outpatient for follow-up scan. Acute kidney injury. Presented with creatinine of 1.4, improved to normal    Hypothyroid. Continue home medications    Hypertension. Continue metoprolol    Anxiety. Continue home medications    Multiple sclerosis. Continue to monitor    OCD. Continue medications      DVT Prophylaxis: lovenox  Diet: ADULT DIET;  Regular  Code Status: Full Code    PT/OT Eval Status: ambulatory     Dispo - pending clinical improvement (1-2 days)     Tyler Earl MD

## 2022-06-14 NOTE — PROGRESS NOTES
Pt awake in bed. Pt denies any pain, nausea , or SOB. Pt also denies any diarrhea today. Pt's scheduled meds are given as ordered by MD. Plan of care updated with the pt. Pt denies any other needs at present time.  Call light and item need in reach Electronically signed by Марина Kincaid RN on 6/14/2022 at 9:32 AM

## 2022-06-14 NOTE — PLAN OF CARE
Problem: Discharge Planning  Goal: Discharge to home or other facility with appropriate resources  6/14/2022 1104 by Jayashree Jones RN  Outcome: Progressing     Problem: Safety - Adult  Goal: Free from fall injury  6/14/2022 1104 by Jayashree Jones RN  Outcome: Progressing  Flowsheets (Taken 6/14/2022 1104)  Free From Fall Injury: Instruct family/caregiver on patient safety  Note: Pt free from falls this shift. Non skid socks provided. Pt educated on use of call light as needed for assistance. Call light always within reach. Pt able and agreeable to contact for safety appropriately.     6/13/2022 2307 by Clarke Pualino RN  Outcome: Progressing     Problem: ABCDS Injury Assessment  Goal: Absence of physical injury  Outcome: Progressing

## 2022-06-15 VITALS
SYSTOLIC BLOOD PRESSURE: 146 MMHG | WEIGHT: 167.9 LBS | TEMPERATURE: 98 F | DIASTOLIC BLOOD PRESSURE: 75 MMHG | RESPIRATION RATE: 18 BRPM | HEART RATE: 82 BPM | OXYGEN SATURATION: 93 % | BODY MASS INDEX: 27.94 KG/M2

## 2022-06-15 LAB
ANION GAP SERPL CALCULATED.3IONS-SCNC: 8 MMOL/L (ref 3–16)
BASOPHILS ABSOLUTE: 0 K/UL (ref 0–0.2)
BASOPHILS RELATIVE PERCENT: 0.6 %
BUN BLDV-MCNC: 15 MG/DL (ref 7–20)
CALCIUM SERPL-MCNC: 8 MG/DL (ref 8.3–10.6)
CHLORIDE BLD-SCNC: 109 MMOL/L (ref 99–110)
CO2: 23 MMOL/L (ref 21–32)
CREAT SERPL-MCNC: 0.9 MG/DL (ref 0.6–1.2)
EOSINOPHILS ABSOLUTE: 0.1 K/UL (ref 0–0.6)
EOSINOPHILS RELATIVE PERCENT: 2 %
GFR AFRICAN AMERICAN: >60
GFR NON-AFRICAN AMERICAN: >60
GLUCOSE BLD-MCNC: 107 MG/DL (ref 70–99)
HCT VFR BLD CALC: 32.9 % (ref 36–48)
HEMOGLOBIN: 11.3 G/DL (ref 12–16)
LYMPHOCYTES ABSOLUTE: 0.8 K/UL (ref 1–5.1)
LYMPHOCYTES RELATIVE PERCENT: 12.5 %
MCH RBC QN AUTO: 32.7 PG (ref 26–34)
MCHC RBC AUTO-ENTMCNC: 34.4 G/DL (ref 31–36)
MCV RBC AUTO: 95.1 FL (ref 80–100)
MONOCYTES ABSOLUTE: 0.5 K/UL (ref 0–1.3)
MONOCYTES RELATIVE PERCENT: 8.2 %
NEUTROPHILS ABSOLUTE: 4.7 K/UL (ref 1.7–7.7)
NEUTROPHILS RELATIVE PERCENT: 76.7 %
ORGANISM: ABNORMAL
ORGANISM: ABNORMAL
PDW BLD-RTO: 13 % (ref 12.4–15.4)
PLATELET # BLD: 162 K/UL (ref 135–450)
PMV BLD AUTO: 7.4 FL (ref 5–10.5)
POTASSIUM REFLEX MAGNESIUM: 4.1 MMOL/L (ref 3.5–5.1)
RBC # BLD: 3.46 M/UL (ref 4–5.2)
SODIUM BLD-SCNC: 140 MMOL/L (ref 136–145)
URINE CULTURE, ROUTINE: ABNORMAL
URINE CULTURE, ROUTINE: ABNORMAL
WBC # BLD: 6.2 K/UL (ref 4–11)

## 2022-06-15 PROCEDURE — 2580000003 HC RX 258: Performed by: NURSE PRACTITIONER

## 2022-06-15 PROCEDURE — 80048 BASIC METABOLIC PNL TOTAL CA: CPT

## 2022-06-15 PROCEDURE — 85025 COMPLETE CBC W/AUTO DIFF WBC: CPT

## 2022-06-15 PROCEDURE — 36415 COLL VENOUS BLD VENIPUNCTURE: CPT

## 2022-06-15 PROCEDURE — 6370000000 HC RX 637 (ALT 250 FOR IP): Performed by: NURSE PRACTITIONER

## 2022-06-15 PROCEDURE — 6360000002 HC RX W HCPCS: Performed by: NURSE PRACTITIONER

## 2022-06-15 RX ORDER — ARMODAFINIL 250 MG/1
250 TABLET ORAL DAILY
COMMUNITY
Start: 2022-05-17

## 2022-06-15 RX ORDER — ARMODAFINIL 250 MG/1
250 TABLET ORAL DAILY
Status: DISCONTINUED | OUTPATIENT
Start: 2022-06-15 | End: 2022-06-15 | Stop reason: HOSPADM

## 2022-06-15 RX ORDER — LEVOFLOXACIN 750 MG/1
750 TABLET ORAL DAILY
Qty: 5 TABLET | Refills: 0 | Status: SHIPPED | OUTPATIENT
Start: 2022-06-15 | End: 2022-06-20

## 2022-06-15 RX ADMIN — ASPIRIN 81 MG: 81 TABLET, COATED ORAL at 08:35

## 2022-06-15 RX ADMIN — FLUVOXAMINE MALEATE 100 MG: 50 TABLET, COATED ORAL at 08:35

## 2022-06-15 RX ADMIN — LEVOTHYROXINE SODIUM 125 MCG: 0.12 TABLET ORAL at 06:04

## 2022-06-15 RX ADMIN — ENOXAPARIN SODIUM 40 MG: 100 INJECTION SUBCUTANEOUS at 08:35

## 2022-06-15 RX ADMIN — SODIUM CHLORIDE: 9 INJECTION, SOLUTION INTRAVENOUS at 08:39

## 2022-06-15 NOTE — PROGRESS NOTES
CLINICAL PHARMACY NOTE: MEDS TO BEDS    Total # of Prescriptions Filled: 1   The following medications were delivered to the patient:  Discharge Medication List as of 6/15/2022 11:33 AM      START taking these medications    Details   levoFLOXacin (LEVAQUIN) 750 MG tablet Take 1 tablet by mouth daily for 5 days, Disp-5 tablet, R-0Normal         ·   ·     Additional Documentation:

## 2022-06-15 NOTE — DISCHARGE SUMMARY
Hospital Medicine Discharge Summary    Patient ID: Nichelleie Tanesha      Patient's PCP: Olivia Britton MD    Admit Date: 6/13/2022     Discharge Date:   06/15/22      Admitting Provider: Kalie Baires DO     Discharge Provider: Endy Sandoval MD     Discharge Diagnoses: Active Hospital Problems    Diagnosis     Pyelonephritis [N12]      Priority: Medium    OCD (obsessive compulsive disorder) [F42.9]      Priority: Medium    Hypothyroid [E03.9]      Priority: Medium    Anxiety [F41.9]      Priority: Medium    HTN (hypertension) [I10]      Priority: Medium    Multiple sclerosis (HCC) [G35]      Priority: Medium    WILMA (acute kidney injury) ([facility] [N17.9]      Priority: Medium    Sepsis ([facility] [A41.9]        The patient was seen and examined on day of discharge and this discharge summary is in conjunction with any daily progress note from day of discharge. Hospital Course:     66 y. o. female with PMHx of hypothyroidism, OCD, anxiety, hypertension and multiple sclerosis presented to [facility] emergency department with a 1 week complaint of myalgias, fevers, severe nausea and diarrhea. Jorje Adams has not vomited. Jorje Adams has multiple sclerosis and thought her symptoms were related to her MS medication however the body aches and nausea became so severe that she came through the emergency department today. Shlomo Haver was noted that she had tachycardia, abdominal pain and urinary tract infection/pyelonephritis    Acute right pyelonephritis. Nephronia. Patient presented to emergency with fever, nausea, was found to have a urinary tract infection, CT scan showed evidence of acute pyelonephritis with nephronia. urine culture grew Ecoli and klebsiella. Will be discharged on levofloxacin ( based on sensitivity ). Patient has an established urologist, asked to follow up in 2-3 weeks for possible follow up scan.      Acute kidney injury.   Presented with creatinine of 1.4, improved to normal     Hypothyroid. Continue home medications     Hypertension. Continue metoprolol     Anxiety. Continue home medications     Multiple sclerosis. Continue to monitor     OCD. Continue medications      Physical Exam Performed:     BP (!) 146/75   Pulse 82   Temp 98 °F (36.7 °C) (Oral)   Resp 18   Wt 167 lb 14.4 oz (76.2 kg)   SpO2 93%   BMI 27.94 kg/m²       General appearance:  No apparent distress, appears stated age and cooperative. HEENT:  Normal cephalic, atraumatic without obvious deformity. Pupils equal, round, and reactive to light. Extra ocular muscles intact. Conjunctivae/corneas clear. Neck: Supple, with full range of motion. No jugular venous distention. Trachea midline. Respiratory:  Normal respiratory effort. Clear to auscultation, bilaterally without Rales/Wheezes/Rhonchi. Cardiovascular:  Regular rate and rhythm with normal S1/S2 without murmurs, rubs or gallops. Abdomen: Soft, non-tender, non-distended with normal bowel sounds. Musculoskeletal:  No clubbing, cyanosis or edema bilaterally. Full range of motion without deformity. Skin: Skin color, texture, turgor normal.  No rashes or lesions. Neurologic:  Neurovascularly intact without any focal sensory/motor deficits. Cranial nerves: II-XII intact, grossly non-focal.  Psychiatric:  Alert and oriented, thought content appropriate, normal insight  Capillary Refill: Brisk,< 3 seconds   Peripheral Pulses: +2 palpable, equal bilaterally       Labs:  For convenience and continuity at follow-up the following most recent labs are provided:      CBC:    Lab Results   Component Value Date    WBC 6.2 06/15/2022    HGB 11.3 06/15/2022    HCT 32.9 06/15/2022     06/15/2022       Renal:    Lab Results   Component Value Date     06/15/2022    K 4.1 06/15/2022     06/15/2022    CO2 23 06/15/2022    BUN 15 06/15/2022    CREATININE 0.9 06/15/2022    CALCIUM 8.0 06/15/2022         Significant Diagnostic Studies    Radiology: CT HEAD WO CONTRAST   Final Result   Mild atrophy and mild chronic microischemic changes scattered the deep white   matter with no acute intracranial abnormality seen. CT ABDOMEN PELVIS W IV CONTRAST Additional Contrast? None   Final Result   Findings suspicious for mild acute pyelonephritis in the right kidney with   vague areas of probable lobar nephronia along the cortex laterally and   inferiorly with no obvious enhancing mass seen. No hydronephrosis or renal   stones are seen. Recommend correlating with a urinalysis and suggest   urological consultation and short-term follow-up to assure resolution. Cholelithiasis with mild hydrops of the gallbladder. Mild bibasilar atelectasis or early infiltrates      Borderline hepatosplenomegaly. Probable 6 mm cyst left lobe liver which is too small to further characterize. Atrophic uterus with no pelvic mass      Diffuse mild bladder wall thickening which may just be due to poor   distention. Recommend correlating with a urinalysis. XR CHEST PORTABLE   Final Result   No acute process. Consults:     None    Disposition:  Home      Condition at Discharge: Stable    Discharge Instructions/Follow-up:    - levofloxacin for 5 more days. - follow up with urology for repeat scans. Code Status:  Full Code     Activity: activity as tolerated    Diet: regular diet      Discharge Medications:     Current Discharge Medication List           Details   levoFLOXacin (LEVAQUIN) 750 MG tablet Take 1 tablet by mouth daily for 5 days  Qty: 5 tablet, Refills: 0              Details   Armodafinil 250 MG TABS Take 250 mg by mouth daily.       Omega-3 Fatty Acids (FISH OIL) 1000 MG CAPS Take 1,000 mg by mouth daily      polyethylene glycol (GLYCOLAX) 17 GM/SCOOP powder Take 17 g by mouth every morning      aspirin 81 MG EC tablet Take 81 mg by mouth daily      levothyroxine (SYNTHROID) 125 MCG tablet Take 125 mcg by mouth Daily fluvoxaMINE (LUVOX) 100 MG tablet Take 100 mg by mouth 2 times daily      metoprolol succinate (TOPROL XL) 50 MG extended release tablet Take 50 mg by mouth nightly      calcium citrate (CALCITRATE) 950 MG tablet Take 1 tablet by mouth 2 times daily       Cholecalciferol (VITAMIN D) 2000 units CAPS capsule Take 4,000 capsules by mouth daily      clonazePAM (KLONOPIN) 1 MG tablet Take 1.5 mg by mouth nightly as needed for Anxiety. Time Spent on discharge is more than 30 minutes in the examination, evaluation, counseling and review of medications and discharge plan. Signed:    Parminder Spring MD   6/15/2022      Thank you Ronaldo Rapp MD for the opportunity to be involved in this patient's care. If you have any questions or concerns, please feel free to contact me at [phone].

## 2022-06-15 NOTE — PLAN OF CARE
Problem: Safety - Adult  Goal: Free from fall injury  6/14/2022 2325 by Lyubov Rice RN  Outcome: Progressing  6/14/2022 1104 by dEmund Yañez RN  Outcome: Progressing  Flowsheets (Taken 6/14/2022 1104)  Free From Fall Injury: Instruct family/caregiver on patient safety  Note: Pt free from falls this shift. Non skid socks provided. Pt educated on use of call light as needed for assistance. Call light always within reach. Pt able and agreeable to contact for safety appropriately.        Problem: ABCDS Injury Assessment  Goal: Absence of physical injury  6/14/2022 2325 by Lyubov Rice RN  Outcome: Progressing  6/14/2022 1104 by Edmund Yañez RN  Outcome: Progressing

## 2022-06-15 NOTE — PROGRESS NOTES
All verbal and written discharge instructions given to the pt at discharge regarding new meds and follow up appointment. Pt denies other needs at discharge.  Electronically signed by Jayashree Jones RN on 6/15/2022 at 12:23 PM

## 2022-06-15 NOTE — PLAN OF CARE
Dr. Mitchell at bedside.    speaking with pt's wife.    Problem: Discharge Planning  Goal: Discharge to home or other facility with appropriate resources  6/15/2022 0925 by Kaycee Meadows RN  Outcome: Progressing  Flowsheets (Taken 6/15/2022 0925)  Discharge to home or other facility with appropriate resources:   Identify barriers to discharge with patient and caregiver   Arrange for needed discharge resources and transportation as appropriate     Problem: Safety - Adult  Goal: Free from fall injury  6/15/2022 0925 by Kaycee Meadows RN  Outcome: Progressing  Flowsheets (Taken 6/15/2022 0925)  Free From Fall Injury: Instruct family/caregiver on patient safety  Note: Pt free from falls this shift. Non skid socks provided. Pt educated on use of call light as needed for assistance. Call light always within reach. Pt able and agreeable to contact for safety appropriately.     6/14/2022 2325 by Sendy Washington RN  Outcome: Progressing     Problem: ABCDS Injury Assessment  Goal: Absence of physical injury  6/15/2022 0925 by Kaycee Meadows RN  Outcome: Progressing  6/14/2022 2325 by Sendy Washington RN  Outcome: Progressing

## 2022-06-15 NOTE — CARE COORDINATION
Discharge Planning:  SW spoke with pt who confirmed that she is independent with all self care and plans to return home with her spouse at d/c. Pt declined the need for an PT/OT at this time. Plan: Home with spouse at d/c. Spouse will transport pt home at d/c.  MIKE Kessler  [phone]  Electronically signed by Sagrario Almeida on 6/15/2022 at 10:17 AM    DISCHARGE SUMMARY     DATE OF DISCHARGE: 06/15/2022    DISCHARGE DESTINATION: Home with spouse      TRANSPORTATION: Spouse      COMMENTS: No d/c needs noted.    MIKE Kessler LOLA  [phone]  Electronically signed by Sagrario Almeida on 6/15/2022 at 11:09 AM

## 2022-06-15 NOTE — PROGRESS NOTES
Physician Progress Note      Osmel Garcia  CSN #:                  [id number]  :                       1950  ADMIT DATE:       2022 4:25 PM  DISCH DATE:  RESPONDING  PROVIDER #:        Aarti Marina          QUERY TEXT:    Patient admitted with acute pyelonephritis. Noted documentation of Sepsis in   active problem list . ? Please document in progress notes the clinical   indicators to support this diagnosis on current admission or document if this   diagnosis is ruled out after study . ? Please update active hospital problems   appropriately to reflect response. The medical record reflects the following:  Risk Factors: acute pyelonephritis  Clinical Indicators: Documentation in active problem list of Sepsis. On   admission temp 99,  hr 120, wbc- 6.5. lactic acid-1.5  Treatment:  ivf, monitor labs, iv rocephin    Thank you, Feli Tripp RN CDS CRCR [email or web address]  Options provided:  -- Sepsis has been ruled out after study  -- Sepsis is currently being treated/evaluated as evidenced by, Please   document clinical support. -- Other - I will add my own diagnosis  -- Disagree - Not applicable / Not valid  -- Disagree - Clinically unable to determine / Unknown  -- Refer to Clinical Documentation Reviewer    PROVIDER RESPONSE TEXT:    Sepsis has been ruled out after study. Query created by:  Susan Tripp on 6/15/2022 7:12 AM      Electronically signed by:  Aarti Marina 6/15/2022 8:56 AM

## 2022-06-15 NOTE — PLAN OF CARE
Problem: Discharge Planning  Goal: Discharge to home or other facility with appropriate resources  6/15/2022 1052 by Kaycee Meadows RN  Outcome: Completed  6/15/2022 0925 by Kaycee Meadows RN  Outcome: Progressing  Flowsheets (Taken 6/15/2022 5973)  Discharge to home or other facility with appropriate resources:   Identify barriers to discharge with patient and caregiver   Arrange for needed discharge resources and transportation as appropriate  6/14/2022 2325 by Sendy Washington RN  Outcome: Progressing     Problem: Safety - Adult  Goal: Free from fall injury  6/15/2022 1052 by Kaycee Meadows RN  Outcome: Completed  6/15/2022 0925 by Kaycee Meadows RN  Outcome: Progressing  Flowsheets (Taken 6/15/2022 0925)  Free From Fall Injury: Instruct family/caregiver on patient safety  Note: Pt free from falls this shift. Non skid socks provided. Pt educated on use of call light as needed for assistance. Call light always within reach. Pt able and agreeable to contact for safety appropriately.     6/14/2022 2325 by Sendy Washington RN  Outcome: Progressing     Problem: ABCDS Injury Assessment  Goal: Absence of physical injury  6/15/2022 1052 by Kaycee Meadows RN  Outcome: Completed  6/15/2022 0925 by Kaycee Meadows RN  Outcome: Progressing  6/14/2022 2325 by Sendy Washington RN  Outcome: Progressing

## 2022-06-17 LAB
BLOOD CULTURE, ROUTINE: NORMAL
CULTURE, BLOOD 2: NORMAL

## 2024-10-01 ENCOUNTER — APPOINTMENT (OUTPATIENT)
Dept: GENERAL RADIOLOGY | Age: 74
End: 2024-10-01
Payer: MEDICARE

## 2024-10-01 ENCOUNTER — HOSPITAL ENCOUNTER (EMERGENCY)
Age: 74
Discharge: HOME | End: 2024-10-01
Attending: EMERGENCY MEDICINE
Payer: MEDICARE

## 2024-10-01 ENCOUNTER — APPOINTMENT (OUTPATIENT)
Dept: CT IMAGING | Age: 74
End: 2024-10-01
Payer: MEDICARE

## 2024-10-01 VITALS
HEART RATE: 66 BPM | RESPIRATION RATE: 16 BRPM | BODY MASS INDEX: 30.82 KG/M2 | WEIGHT: 185.19 LBS | OXYGEN SATURATION: 94 % | TEMPERATURE: 97.7 F | DIASTOLIC BLOOD PRESSURE: 54 MMHG | SYSTOLIC BLOOD PRESSURE: 118 MMHG
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
## 2024-10-01 DIAGNOSIS — S09.90XA INJURY OF HEAD, INITIAL ENCOUNTER: ICD-10-CM

## 2024-10-01 LAB
ANION GAP SERPL CALCULATED.3IONS-SCNC: 8 MMOL/L (ref 3–16)
BASOPHILS # BLD: 0.1 K/UL (ref 0–0.2)
BASOPHILS NFR BLD: 0.8 %
BILIRUB UR QL STRIP.AUTO: NEGATIVE
BUN SERPL-MCNC: 35 MG/DL (ref 7–20)
CALCIUM SERPL-MCNC: 9 MG/DL (ref 8.3–10.6)
CHLORIDE SERPL-SCNC: 105 MMOL/L (ref 99–110)
CLARITY UR: CLEAR
CO2 SERPL-SCNC: 27 MMOL/L (ref 21–32)
COLOR UR: YELLOW
CREAT SERPL-MCNC: 1 MG/DL (ref 0.6–1.2)
DEPRECATED RDW RBC AUTO: 13.7 % (ref 12.4–15.4)
EKG ATRIAL RATE: 66 BPM
EKG DIAGNOSIS: NORMAL
EKG P AXIS: 24 DEGREES
EKG P-R INTERVAL: 198 MS
EKG Q-T INTERVAL: 384 MS
EKG QRS DURATION: 70 MS
EKG QTC CALCULATION (BAZETT): 402 MS
EKG R AXIS: -18 DEGREES
EKG T AXIS: 32 DEGREES
EKG VENTRICULAR RATE: 66 BPM
EOSINOPHIL # BLD: 0.3 K/UL (ref 0–0.6)
EOSINOPHIL NFR BLD: 2.2 %
GFR SERPLBLD CREATININE-BSD FMLA CKD-EPI: 59 ML/MIN/{1.73_M2}
GLUCOSE SERPL-MCNC: 113 MG/DL (ref 70–99)
GLUCOSE UR STRIP.AUTO-MCNC: NEGATIVE MG/DL
HCT VFR BLD AUTO: 40 % (ref 36–48)
HGB BLD-MCNC: 13.6 G/DL (ref 12–16)
HGB UR QL STRIP.AUTO: NEGATIVE
KETONES UR STRIP.AUTO-MCNC: NEGATIVE MG/DL
LEUKOCYTE ESTERASE UR QL STRIP.AUTO: NEGATIVE
LYMPHOCYTES # BLD: 2.9 K/UL (ref 1–5.1)
LYMPHOCYTES NFR BLD: 24.8 %
MCH RBC QN AUTO: 32.6 PG (ref 26–34)
MCHC RBC AUTO-ENTMCNC: 34 G/DL (ref 31–36)
MCV RBC AUTO: 95.8 FL (ref 80–100)
MONOCYTES # BLD: 0.8 K/UL (ref 0–1.3)
MONOCYTES NFR BLD: 6.7 %
NEUTROPHILS # BLD: 7.6 K/UL (ref 1.7–7.7)
NEUTROPHILS NFR BLD: 65.5 %
NITRITE UR QL STRIP.AUTO: NEGATIVE
PH UR STRIP.AUTO: 6.5 [PH] (ref 5–8)
PLATELET # BLD AUTO: 208 K/UL (ref 135–450)
PMV BLD AUTO: 8.3 FL (ref 5–10.5)
POTASSIUM SERPL-SCNC: 3.9 MMOL/L (ref 3.5–5.1)
PROT UR STRIP.AUTO-MCNC: NEGATIVE MG/DL
RBC # BLD AUTO: 4.17 M/UL (ref 4–5.2)
SODIUM SERPL-SCNC: 140 MMOL/L (ref 136–145)
SP GR UR STRIP.AUTO: 1.03 (ref 1–1.03)
TROPONIN, HIGH SENSITIVITY: 14 NG/L (ref 0–14)
UA COMPLETE W REFLEX CULTURE PNL UR: NORMAL
UA DIPSTICK W REFLEX MICRO PNL UR: NORMAL
URN SPEC COLLECT METH UR: NORMAL
UROBILINOGEN UR STRIP-ACNC: 0.2 E.U./DL
WBC # BLD AUTO: 11.6 K/UL (ref 4–11)

## 2024-10-01 PROCEDURE — 81003 URINALYSIS AUTO W/O SCOPE: CPT

## 2024-10-01 PROCEDURE — 80048 BASIC METABOLIC PNL TOTAL CA: CPT

## 2024-10-01 PROCEDURE — 72125 CT NECK SPINE W/O DYE: CPT

## 2024-10-01 PROCEDURE — 85025 COMPLETE CBC W/AUTO DIFF WBC: CPT

## 2024-10-01 PROCEDURE — 84484 ASSAY OF TROPONIN QUANT: CPT

## 2024-10-01 PROCEDURE — 70450 CT HEAD/BRAIN W/O DYE: CPT

## 2024-10-01 PROCEDURE — 99285 EMERGENCY DEPT VISIT HI MDM: CPT

## 2024-10-01 PROCEDURE — 71046 X-RAY EXAM CHEST 2 VIEWS: CPT

## 2024-10-01 PROCEDURE — 2580000003 HC RX 258: Performed by: EMERGENCY MEDICINE

## 2024-10-01 PROCEDURE — 93010 ELECTROCARDIOGRAM REPORT: CPT | Performed by: INTERNAL MEDICINE

## 2024-10-01 PROCEDURE — 93005 ELECTROCARDIOGRAM TRACING: CPT | Performed by: EMERGENCY MEDICINE

## 2024-10-01 RX ORDER — IOPAMIDOL 755 MG/ML
75 INJECTION, SOLUTION INTRAVASCULAR
Status: DISCONTINUED | OUTPATIENT
Start: 2024-10-01 | End: 2024-10-01 | Stop reason: HOSPADM

## 2024-10-01 RX ORDER — 0.9 % SODIUM CHLORIDE 0.9 %
500 INTRAVENOUS SOLUTION INTRAVENOUS ONCE
Status: COMPLETED | OUTPATIENT
Start: 2024-10-01 | End: 2024-10-01

## 2024-10-01 RX ADMIN — SODIUM CHLORIDE 500 ML: 9 INJECTION, SOLUTION INTRAVENOUS at 14:25

## 2024-10-01 ASSESSMENT — PAIN SCALES - GENERAL
PAINLEVEL_OUTOF10: 0
PAINLEVEL_OUTOF10: 0

## 2024-10-01 ASSESSMENT — PAIN - FUNCTIONAL ASSESSMENT: PAIN_FUNCTIONAL_ASSESSMENT: NONE - DENIES PAIN

## 2024-10-01 NOTE — ED TRIAGE NOTES
Pt in via EMS after falling backwards and hitting head on concrete. Pt states that she lost consciousness briefly after hitting head. Pt denies being on blood thinners or having any pain at this time. Pt has history of MS and was going to physical therapy appointment for weakness. Pt alert and oriented at this time with no signs of distress noted.

## 2024-10-01 NOTE — ED NOTES
This tech attempted orthostatic vitals at 1409, pt stated lack of confidence in her ability to stand and did not want to partake at this time. Dr. Currie informed.

## 2024-10-01 NOTE — DISCHARGE INSTRUCTIONS
1.  Increase your fluid intake.  2.  Follow with your primary care physician in the next few days call for an appointment.  3.  Return emergeNCY department for syncope with severe chest pain palpitations or shortness of breath

## 2024-10-01 NOTE — ED PROVIDER NOTES
TO:   Abelardo Mclaughlin MD  [id number]  [facility] [address] [phone]    Schedule an appointment as soon as possible for a visit   asap     DISCHARGE MEDICATIONS:   New Prescriptions    No medications on file      DISCONTINUED MEDICATIONS:   Discontinued Medications    No medications on file            (Please note that portions of this note were completed with a voice recognition program.  Efforts were made to edit the dictations but occasionally words are mis-transcribed.)     Ash Currie MD (electronically signed)        Ash Currie MD  10/01/24 0399

## 2025-01-30 ENCOUNTER — HOSPITAL ENCOUNTER (OUTPATIENT)
Dept: CT IMAGING | Age: 75
Discharge: HOME | End: 2025-01-30
Attending: UROLOGY
Payer: MEDICARE

## 2025-01-30 DIAGNOSIS — N30.21 OTHER CHRONIC CYSTITIS WITH HEMATURIA: ICD-10-CM

## 2025-01-30 DIAGNOSIS — N31.9 NEUROGENIC DYSFUNCTION OF THE URINARY BLADDER: ICD-10-CM

## 2025-01-30 PROCEDURE — 74176 CT ABD & PELVIS W/O CONTRAST: CPT

## 2025-03-11 ENCOUNTER — TRANSCRIBE ORDERS (OUTPATIENT)
Dept: ADMINISTRATIVE | Age: 75
End: 2025-03-11

## 2025-03-11 DIAGNOSIS — N39.0 URINARY TRACT INFECTION WITHOUT HEMATURIA, SITE UNSPECIFIED: Primary | ICD-10-CM

## 2025-03-11 RX ORDER — SODIUM CHLORIDE 9 MG/ML
INJECTION, SOLUTION INTRAVENOUS CONTINUOUS
OUTPATIENT
Start: 2025-03-12

## 2025-03-11 RX ORDER — ACETAMINOPHEN 325 MG/1
650 TABLET ORAL
OUTPATIENT
Start: 2025-03-12

## 2025-03-11 RX ORDER — HEPARIN SODIUM (PORCINE) LOCK FLUSH IV SOLN 100 UNIT/ML 100 UNIT/ML
500 SOLUTION INTRAVENOUS PRN
OUTPATIENT
Start: 2025-03-12

## 2025-03-11 RX ORDER — ALBUTEROL SULFATE 90 UG/1
4 INHALANT RESPIRATORY (INHALATION) PRN
OUTPATIENT
Start: 2025-03-12

## 2025-03-11 RX ORDER — EPINEPHRINE 1 MG/ML
0.3 INJECTION, SOLUTION, CONCENTRATE INTRAVENOUS PRN
OUTPATIENT
Start: 2025-03-12

## 2025-03-11 RX ORDER — SODIUM CHLORIDE 9 MG/ML
5-250 INJECTION, SOLUTION INTRAVENOUS PRN
OUTPATIENT
Start: 2025-03-12

## 2025-03-11 RX ORDER — HYDROCORTISONE SODIUM SUCCINATE 100 MG/2ML
100 INJECTION INTRAMUSCULAR; INTRAVENOUS
OUTPATIENT
Start: 2025-03-12

## 2025-03-11 RX ORDER — DIPHENHYDRAMINE HYDROCHLORIDE 50 MG/ML
50 INJECTION, SOLUTION INTRAMUSCULAR; INTRAVENOUS
OUTPATIENT
Start: 2025-03-12

## 2025-03-11 RX ORDER — ONDANSETRON 2 MG/ML
8 INJECTION INTRAMUSCULAR; INTRAVENOUS
OUTPATIENT
Start: 2025-03-12

## 2025-03-12 ENCOUNTER — HOSPITAL ENCOUNTER (OUTPATIENT)
Dept: INFUSION THERAPY | Age: 75
Setting detail: INFUSION SERIES
Discharge: HOME | End: 2025-03-12
Payer: MEDICARE

## 2025-03-12 VITALS
RESPIRATION RATE: 16 BRPM | HEART RATE: 72 BPM | OXYGEN SATURATION: 96 % | SYSTOLIC BLOOD PRESSURE: 117 MMHG | TEMPERATURE: 97.3 F | DIASTOLIC BLOOD PRESSURE: 56 MMHG

## 2025-03-12 DIAGNOSIS — N39.0 URINARY TRACT INFECTION WITHOUT HEMATURIA, SITE UNSPECIFIED: Primary | ICD-10-CM

## 2025-03-12 DIAGNOSIS — M81.0 SENILE OSTEOPOROSIS: ICD-10-CM

## 2025-03-12 PROCEDURE — 2580000003 HC RX 258: Performed by: UROLOGY

## 2025-03-12 PROCEDURE — C1751 CATH, INF, PER/CENT/MIDLINE: HCPCS

## 2025-03-12 PROCEDURE — 76937 US GUIDE VASCULAR ACCESS: CPT

## 2025-03-12 PROCEDURE — 96365 THER/PROPH/DIAG IV INF INIT: CPT

## 2025-03-12 PROCEDURE — 6360000002 HC RX W HCPCS: Performed by: UROLOGY

## 2025-03-12 PROCEDURE — 2500000003 HC RX 250 WO HCPCS: Performed by: UROLOGY

## 2025-03-12 PROCEDURE — 36569 INSJ PICC 5 YR+ W/O IMAGING: CPT

## 2025-03-12 RX ORDER — ALBUTEROL SULFATE 90 UG/1
4 INHALANT RESPIRATORY (INHALATION) PRN
OUTPATIENT
Start: 2025-03-12

## 2025-03-12 RX ORDER — SODIUM CHLORIDE 9 MG/ML
5-250 INJECTION, SOLUTION INTRAVENOUS PRN
OUTPATIENT
Start: 2025-03-12

## 2025-03-12 RX ORDER — HYDROCORTISONE SODIUM SUCCINATE 100 MG/2ML
100 INJECTION INTRAMUSCULAR; INTRAVENOUS
OUTPATIENT
Start: 2025-03-12

## 2025-03-12 RX ORDER — MULTIVITAMIN
1 CAPSULE ORAL DAILY
COMMUNITY

## 2025-03-12 RX ORDER — SODIUM CHLORIDE 0.9 % (FLUSH) 0.9 %
5-40 SYRINGE (ML) INJECTION PRN
Status: DISCONTINUED | OUTPATIENT
Start: 2025-03-12 | End: 2025-03-13 | Stop reason: HOSPADM

## 2025-03-12 RX ORDER — ACETAMINOPHEN 325 MG/1
650 TABLET ORAL
OUTPATIENT
Start: 2025-03-12

## 2025-03-12 RX ORDER — HEPARIN SODIUM (PORCINE) LOCK FLUSH IV SOLN 100 UNIT/ML 100 UNIT/ML
500 SOLUTION INTRAVENOUS PRN
OUTPATIENT
Start: 2025-03-12

## 2025-03-12 RX ORDER — DIMENHYDRINATE 50 MG
1000 TABLET ORAL 2 TIMES DAILY
COMMUNITY

## 2025-03-12 RX ORDER — ONDANSETRON 2 MG/ML
8 INJECTION INTRAMUSCULAR; INTRAVENOUS
OUTPATIENT
Start: 2025-03-12

## 2025-03-12 RX ORDER — EPINEPHRINE 1 MG/ML
0.3 INJECTION, SOLUTION, CONCENTRATE INTRAVENOUS PRN
OUTPATIENT
Start: 2025-03-12

## 2025-03-12 RX ORDER — SODIUM CHLORIDE 9 MG/ML
INJECTION, SOLUTION INTRAVENOUS CONTINUOUS
OUTPATIENT
Start: 2025-03-12

## 2025-03-12 RX ORDER — SODIUM CHLORIDE 0.9 % (FLUSH) 0.9 %
5-40 SYRINGE (ML) INJECTION PRN
OUTPATIENT
Start: 2025-03-12

## 2025-03-12 RX ORDER — DIPHENHYDRAMINE HYDROCHLORIDE 50 MG/ML
50 INJECTION, SOLUTION INTRAMUSCULAR; INTRAVENOUS
OUTPATIENT
Start: 2025-03-12

## 2025-03-12 RX ORDER — BACLOFEN 10 MG/1
10 TABLET ORAL 4 TIMES DAILY
COMMUNITY
Start: 2024-11-18

## 2025-03-12 RX ORDER — ESTRADIOL 0.1 MG/G
0.1 CREAM VAGINAL
COMMUNITY
Start: 2025-01-24

## 2025-03-12 RX ADMIN — CEFEPIME 2000 MG: 2 INJECTION, POWDER, FOR SOLUTION INTRAVENOUS at 09:29

## 2025-03-12 RX ADMIN — Medication 10 ML: at 09:28

## 2025-03-12 RX ADMIN — Medication 40 ML: at 09:59

## 2025-03-12 NOTE — PROGRESS NOTES
Outpatient Infusion Center  [facility] [place] [facility]    IV Antibiotic Visit    NAME:  Nely Hou  YOB: 1950  MEDICAL RECORD NUMBER:  [id number]  DATE:  3/12/2025    Patient arrived to Outpatient Infusion Center   [] per wheelchair   [x] ambulatory with cane    Alert and oriented X 4. PICC placed this Am and here to receive first dose of cefepime. Will continue rest of doses at home. HX of frequent UTI's. Has been on multiple oral antibiotics without success.     Itching: No  Rash: No  Mouth Sores: No  Nausea: No  Vomiting: No  Diarrhea: No  Night Sweats: No  Fever: No    Administered: [] Peripheral access    [x] PICC access    [] Port access    Allergies   Allergen Reactions    Teriflunomide Diarrhea    Sulfa Antibiotics        Name of Antibiotic Administered: Cefepime  Dose of Antibiotic Administered: 2000 mg.    Indication for Antibiotic: UTI      Response to treatment:  Well tolerated by patient.       Scheduled to return for next antibiotic dose tomorrow per home care.     Instructions printed and verbally instructed re Cefepime, care of PICC, flushes, care of site, showering, avoid heavy lifting, exercise, and sleeping on right arm.  Instructed to call if needs dressing change or D/C PICC if homecare unable to do. Verbalized understanding.     Electronically signed by SANIYA BRIGHT RN on 3/12/2025 at 9:41 AM

## 2025-03-12 NOTE — DISCHARGE INSTRUCTIONS
Outpatient Infusion Discharge Instructions  [facility]  [address] [place], [place] [address]  Telephone: [phone]      FAX [phone]    NAME:  Nely Hou  YOB: 1950  MEDICAL RECORD NUMBER:  [id number]  DATE:  3/12/25    Reason for Outpatient Infusion Visit: PICC placement and 1st dose Cefepime 2 GM    If you develop any these symptoms please contact you Doctor    [] Nausea and/or vomiting not relieved with medication   [x] Swelling, redness, and/or bleeding at injection or IV site    [] Fever or chills  [] Rash or itching   [] Shortness of breath  [x] Please review After Visit Summary (AVS) information on  cefepime, PICC care  [] Other    FLUSHING PICC LINE AT HOME    -  Gather supplies:  Saline syringe, alcohol pad, 4 x 4 gauze pad    -  Wash hands both patient and caregiver    -  Remove teal alcohol cap. Do not remove big blue tip or dressing. PICC dressing is only changed by nurse in clinic.     -  Scrub end of cap with alcohol pad for 30 seconds and lay it on a clean 4 x 4 gauze to let it dry.    -  Remove Saline syringe from clear packaging and hold syringe with white cap at top. This allows air bubble to rise to the tip. Remove white cap from syringe and expel air bubble by pulling back on plunger gently and then pushing air bubble out of syringe very gently.    -  Connect the syringe to the PICC cap by pushing in and screwing syringe on to cap.    -  Push plunger and inject Saline in to PICC line x 2.    -  Unscrew syringe from PICC line cap.  Place an teal alcohol cap to end of PICC line.    -  Recover PICC site with gauze 4x4 and stockinette..            Outpatient Infusion Center Information: Should you experience any significant changes in your health or have questions about your care please contact the [facility] at [phone] MONDAY - FRIDAY 8:00 am - 4:00 pm.  If you need help outside these hours and cannot wait until

## 2025-03-12 NOTE — PROGRESS NOTES
Order for PICC per Dr. Acosta Pre procedure and timeout done with pt's RN.    Successful insertion of a SL PICC line into pt's  right basilic vein. No issues gaining access or advancing guidewire/introducer/PICC line. PICC tip terminates in the SVC according to [employer] 3CG tip confirmation system. PICC was seen dropping into SVC with tip tracking technology and discernable peaked p waves were noted without negative deflection. A printout will be in pt's chart.     PICC is cut at  41 cm and out externally 0 cm. SL lumen flusehs without resistance and draw back brisk blood return.    PICC site CDI with hemostasis maintained and a biopatch applied to site. Pt instructed to stay in bed and keep arm flat and still for 30 minutes  to promote hemostasis.       Aurelia ROD given handoff report

## 2025-03-20 ENCOUNTER — HOSPITAL ENCOUNTER (OUTPATIENT)
Age: 75
Setting detail: SPECIMEN
Discharge: HOME | End: 2025-03-20
Payer: MEDICARE

## 2025-03-20 LAB
BACTERIA URNS QL MICRO: NORMAL /HPF
BILIRUB UR QL STRIP.AUTO: NEGATIVE
CLARITY UR: CLEAR
COLOR UR: YELLOW
EPI CELLS #/AREA URNS AUTO: 2 /HPF (ref 0–5)
GLUCOSE UR STRIP.AUTO-MCNC: NEGATIVE MG/DL
HGB UR QL STRIP.AUTO: NEGATIVE
HYALINE CASTS #/AREA URNS AUTO: 2 /LPF (ref 0–8)
KETONES UR STRIP.AUTO-MCNC: ABNORMAL MG/DL
LEUKOCYTE ESTERASE UR QL STRIP.AUTO: NEGATIVE
NITRITE UR QL STRIP.AUTO: NEGATIVE
PH UR STRIP.AUTO: 6 [PH] (ref 5–8)
PROT UR STRIP.AUTO-MCNC: 30 MG/DL
RBC CLUMPS #/AREA URNS AUTO: 2 /HPF (ref 0–4)
SP GR UR STRIP.AUTO: 1.02 (ref 1–1.03)
UA COMPLETE W REFLEX CULTURE PNL UR: ABNORMAL
UA DIPSTICK W REFLEX MICRO PNL UR: YES
URN SPEC COLLECT METH UR: ABNORMAL
UROBILINOGEN UR STRIP-ACNC: 0.2 E.U./DL
WBC #/AREA URNS AUTO: 1 /HPF (ref 0–5)

## 2025-03-20 PROCEDURE — 81001 URINALYSIS AUTO W/SCOPE: CPT

## 2025-05-27 ENCOUNTER — HOSPITAL ENCOUNTER (EMERGENCY)
Age: 75
Discharge: HOME | End: 2025-05-28
Attending: EMERGENCY MEDICINE
Payer: MEDICARE

## 2025-05-27 DIAGNOSIS — R31.9 URINARY TRACT INFECTION WITH HEMATURIA, SITE UNSPECIFIED: Primary | ICD-10-CM

## 2025-05-27 DIAGNOSIS — N39.0 URINARY TRACT INFECTION WITH HEMATURIA, SITE UNSPECIFIED: Primary | ICD-10-CM

## 2025-05-27 DIAGNOSIS — R11.2 NAUSEA AND VOMITING, UNSPECIFIED VOMITING TYPE: ICD-10-CM

## 2025-05-27 PROCEDURE — 99285 EMERGENCY DEPT VISIT HI MDM: CPT

## 2025-05-27 ASSESSMENT — PAIN - FUNCTIONAL ASSESSMENT: PAIN_FUNCTIONAL_ASSESSMENT: 0-10

## 2025-05-27 ASSESSMENT — LIFESTYLE VARIABLES
HOW MANY STANDARD DRINKS CONTAINING ALCOHOL DO YOU HAVE ON A TYPICAL DAY: PATIENT DOES NOT DRINK
HOW OFTEN DO YOU HAVE A DRINK CONTAINING ALCOHOL: NEVER

## 2025-05-27 ASSESSMENT — PAIN SCALES - GENERAL: PAINLEVEL_OUTOF10: 0

## 2025-05-28 ENCOUNTER — APPOINTMENT (OUTPATIENT)
Dept: GENERAL RADIOLOGY | Age: 75
End: 2025-05-28
Payer: MEDICARE

## 2025-05-28 ENCOUNTER — APPOINTMENT (OUTPATIENT)
Dept: CT IMAGING | Age: 75
End: 2025-05-28
Payer: MEDICARE

## 2025-05-28 VITALS
OXYGEN SATURATION: 96 % | SYSTOLIC BLOOD PRESSURE: 115 MMHG | RESPIRATION RATE: 16 BRPM | HEART RATE: 76 BPM | DIASTOLIC BLOOD PRESSURE: 55 MMHG | TEMPERATURE: 98 F

## 2025-05-28 LAB
ALBUMIN SERPL-MCNC: 3.5 G/DL (ref 3.4–5)
ALBUMIN/GLOB SERPL: 1.3 {RATIO} (ref 1.1–2.2)
ALP SERPL-CCNC: 124 U/L (ref 40–129)
ALT SERPL-CCNC: 16 U/L (ref 10–40)
ANION GAP SERPL CALCULATED.3IONS-SCNC: 12 MMOL/L (ref 3–16)
AST SERPL-CCNC: 26 U/L (ref 15–37)
BACTERIA URNS QL MICRO: ABNORMAL /HPF
BASOPHILS # BLD: 0.1 K/UL (ref 0–0.2)
BASOPHILS NFR BLD: 0.9 %
BILIRUB SERPL-MCNC: 0.4 MG/DL (ref 0–1)
BILIRUB UR QL STRIP.AUTO: NEGATIVE
BUN SERPL-MCNC: 38 MG/DL (ref 7–20)
CALCIUM SERPL-MCNC: 8.7 MG/DL (ref 8.3–10.6)
CHLORIDE SERPL-SCNC: 102 MMOL/L (ref 99–110)
CLARITY UR: ABNORMAL
CO2 SERPL-SCNC: 26 MMOL/L (ref 21–32)
COLOR UR: YELLOW
CREAT SERPL-MCNC: 1.3 MG/DL (ref 0.6–1.2)
DEPRECATED RDW RBC AUTO: 13.7 % (ref 12.4–15.4)
EKG ATRIAL RATE: 96 BPM
EKG DIAGNOSIS: NORMAL
EKG P AXIS: 70 DEGREES
EKG P-R INTERVAL: 166 MS
EKG Q-T INTERVAL: 344 MS
EKG QRS DURATION: 80 MS
EKG QTC CALCULATION (BAZETT): 434 MS
EKG R AXIS: 173 DEGREES
EKG T AXIS: 55 DEGREES
EKG VENTRICULAR RATE: 96 BPM
EOSINOPHIL # BLD: 0 K/UL (ref 0–0.6)
EOSINOPHIL NFR BLD: 0.1 %
EPI CELLS #/AREA URNS AUTO: 28 /HPF (ref 0–5)
FLUAV + FLUBV AG NOSE IA.RAPID: NOT DETECTED
FLUAV + FLUBV AG NOSE IA.RAPID: NOT DETECTED
GFR SERPLBLD CREATININE-BSD FMLA CKD-EPI: 43 ML/MIN/{1.73_M2}
GLUCOSE SERPL-MCNC: 155 MG/DL (ref 70–99)
GLUCOSE UR STRIP.AUTO-MCNC: NEGATIVE MG/DL
HCT VFR BLD AUTO: 37.4 % (ref 36–48)
HGB BLD-MCNC: 12.7 G/DL (ref 12–16)
HGB UR QL STRIP.AUTO: ABNORMAL
HYALINE CASTS #/AREA URNS LPF: ABNORMAL /LPF (ref 0–2)
KETONES UR STRIP.AUTO-MCNC: NEGATIVE MG/DL
LACTATE BLDV-SCNC: 0.8 MMOL/L (ref 0.4–2)
LEUKOCYTE ESTERASE UR QL STRIP.AUTO: ABNORMAL
LIPASE SERPL-CCNC: 30 U/L (ref 13–60)
LYMPHOCYTES # BLD: 0.5 K/UL (ref 1–5.1)
LYMPHOCYTES NFR BLD: 3.9 %
MCH RBC QN AUTO: 31.5 PG (ref 26–34)
MCHC RBC AUTO-ENTMCNC: 34.1 G/DL (ref 31–36)
MCV RBC AUTO: 92.4 FL (ref 80–100)
MONOCYTES # BLD: 1 K/UL (ref 0–1.3)
MONOCYTES NFR BLD: 7.5 %
NEUTROPHILS # BLD: 11.2 K/UL (ref 1.7–7.7)
NEUTROPHILS NFR BLD: 87.6 %
NITRITE UR QL STRIP.AUTO: POSITIVE
NT-PROBNP SERPL-MCNC: 615 PG/ML (ref 0–449)
PH UR STRIP.AUTO: 6 [PH] (ref 5–8)
PLATELET # BLD AUTO: 146 K/UL (ref 135–450)
PMV BLD AUTO: 8.7 FL (ref 5–10.5)
POTASSIUM SERPL-SCNC: 3.7 MMOL/L (ref 3.5–5.1)
PROCALCITONIN SERPL IA-MCNC: 5.71 NG/ML (ref 0–0.15)
PROT SERPL-MCNC: 6.3 G/DL (ref 6.4–8.2)
PROT UR STRIP.AUTO-MCNC: 30 MG/DL
RBC # BLD AUTO: 4.05 M/UL (ref 4–5.2)
RBC CLUMPS #/AREA URNS AUTO: 4 /HPF (ref 0–4)
RENAL EPI CELLS #/AREA UR COMP ASSIST: ABNORMAL /HPF (ref 0–1)
SARS-COV-2 RDRP RESP QL NAA+PROBE: NOT DETECTED
SODIUM SERPL-SCNC: 140 MMOL/L (ref 136–145)
SP GR UR STRIP.AUTO: 1.01 (ref 1–1.03)
UA COMPLETE W REFLEX CULTURE PNL UR: YES
UA DIPSTICK W REFLEX MICRO PNL UR: YES
URN SPEC COLLECT METH UR: ABNORMAL
UROBILINOGEN UR STRIP-ACNC: 0.2 E.U./DL
WBC # BLD AUTO: 12.7 K/UL (ref 4–11)
WBC #/AREA URNS AUTO: 832 /HPF (ref 0–5)

## 2025-05-28 PROCEDURE — 96375 TX/PRO/DX INJ NEW DRUG ADDON: CPT

## 2025-05-28 PROCEDURE — 2500000003 HC RX 250 WO HCPCS: Performed by: EMERGENCY MEDICINE

## 2025-05-28 PROCEDURE — 93010 ELECTROCARDIOGRAM REPORT: CPT | Performed by: INTERNAL MEDICINE

## 2025-05-28 PROCEDURE — 85025 COMPLETE CBC W/AUTO DIFF WBC: CPT

## 2025-05-28 PROCEDURE — 74177 CT ABD & PELVIS W/CONTRAST: CPT

## 2025-05-28 PROCEDURE — 6360000002 HC RX W HCPCS: Performed by: EMERGENCY MEDICINE

## 2025-05-28 PROCEDURE — 2580000003 HC RX 258: Performed by: EMERGENCY MEDICINE

## 2025-05-28 PROCEDURE — 71045 X-RAY EXAM CHEST 1 VIEW: CPT

## 2025-05-28 PROCEDURE — 83690 ASSAY OF LIPASE: CPT

## 2025-05-28 PROCEDURE — 6360000004 HC RX CONTRAST MEDICATION: Performed by: EMERGENCY MEDICINE

## 2025-05-28 PROCEDURE — 36415 COLL VENOUS BLD VENIPUNCTURE: CPT

## 2025-05-28 PROCEDURE — 87635 SARS-COV-2 COVID-19 AMP PRB: CPT

## 2025-05-28 PROCEDURE — 83605 ASSAY OF LACTIC ACID: CPT

## 2025-05-28 PROCEDURE — 93005 ELECTROCARDIOGRAM TRACING: CPT | Performed by: EMERGENCY MEDICINE

## 2025-05-28 PROCEDURE — 80053 COMPREHEN METABOLIC PANEL: CPT

## 2025-05-28 PROCEDURE — 96361 HYDRATE IV INFUSION ADD-ON: CPT

## 2025-05-28 PROCEDURE — 84145 PROCALCITONIN (PCT): CPT

## 2025-05-28 PROCEDURE — 83880 ASSAY OF NATRIURETIC PEPTIDE: CPT

## 2025-05-28 PROCEDURE — 96374 THER/PROPH/DIAG INJ IV PUSH: CPT

## 2025-05-28 PROCEDURE — 81001 URINALYSIS AUTO W/SCOPE: CPT

## 2025-05-28 PROCEDURE — 87502 INFLUENZA DNA AMP PROBE: CPT

## 2025-05-28 PROCEDURE — 87086 URINE CULTURE/COLONY COUNT: CPT

## 2025-05-28 RX ORDER — 0.9 % SODIUM CHLORIDE 0.9 %
1000 INTRAVENOUS SOLUTION INTRAVENOUS ONCE
Status: COMPLETED | OUTPATIENT
Start: 2025-05-28 | End: 2025-05-28

## 2025-05-28 RX ORDER — ONDANSETRON 4 MG/1
4 TABLET, ORALLY DISINTEGRATING ORAL 3 TIMES DAILY PRN
Qty: 15 TABLET | Refills: 0 | Status: SHIPPED | OUTPATIENT
Start: 2025-05-28 | End: 2025-06-02

## 2025-05-28 RX ORDER — IOPAMIDOL 755 MG/ML
75 INJECTION, SOLUTION INTRAVASCULAR
Status: COMPLETED | OUTPATIENT
Start: 2025-05-28 | End: 2025-05-28

## 2025-05-28 RX ORDER — ONDANSETRON 2 MG/ML
4 INJECTION INTRAMUSCULAR; INTRAVENOUS
Status: DISCONTINUED | OUTPATIENT
Start: 2025-05-28 | End: 2025-05-28 | Stop reason: HOSPADM

## 2025-05-28 RX ORDER — CEFUROXIME AXETIL 500 MG/1
500 TABLET ORAL 2 TIMES DAILY
Qty: 20 TABLET | Refills: 0 | Status: SHIPPED | OUTPATIENT
Start: 2025-05-28 | End: 2025-06-07

## 2025-05-28 RX ADMIN — SODIUM CHLORIDE 1000 ML: 9 INJECTION, SOLUTION INTRAVENOUS at 02:48

## 2025-05-28 RX ADMIN — IOPAMIDOL 75 ML: 755 INJECTION, SOLUTION INTRAVENOUS at 03:10

## 2025-05-28 RX ADMIN — WATER 1000 MG: 1 INJECTION INTRAMUSCULAR; INTRAVENOUS; SUBCUTANEOUS at 04:39

## 2025-05-28 RX ADMIN — ONDANSETRON 4 MG: 2 INJECTION INTRAMUSCULAR; INTRAVENOUS at 01:26

## 2025-05-28 RX ADMIN — SODIUM CHLORIDE 1000 ML: 9 INJECTION, SOLUTION INTRAVENOUS at 01:24

## 2025-05-28 ASSESSMENT — PAIN SCALES - GENERAL: PAINLEVEL_OUTOF10: 0

## 2025-05-28 ASSESSMENT — PAIN - FUNCTIONAL ASSESSMENT
PAIN_FUNCTIONAL_ASSESSMENT: NONE - DENIES PAIN
PAIN_FUNCTIONAL_ASSESSMENT: NONE - DENIES PAIN

## 2025-05-28 NOTE — ED PROVIDER NOTES
QTc, no definite ischemic findings and aside from axis, no significant change from prior EKG dated 1 October 2024 [MR]      ED Course User Index  [MR] Freddy Hercules MD         Vitals:    Vitals:    05/27/25 2317 05/28/25 0250 05/28/25 0531   BP: 110/60 122/61 (!) 115/55   Pulse: (!) 117 86 76   Resp: 18 16 16   Temp: 97.9 °F (36.6 °C)  98 °F (36.7 °C)   TempSrc: Oral  Oral   SpO2: 92% 95% 96%        Patient was given the following medications:   Medications   ondansetron (ZOFRAN) injection 4 mg (4 mg IntraVENous Given 5/28/25 0126)   sodium chloride 0.9 % bolus 1,000 mL (0 mLs IntraVENous Stopped 5/28/25 0154)   sodium chloride 0.9 % bolus 1,000 mL (0 mLs IntraVENous Stopped 5/28/25 0400)   iopamidol (ISOVUE-370) 76 % injection 75 mL (75 mLs IntraVENous Given 5/28/25 0310)   cefTRIAXone (ROCEPHIN) 1,000 mg in sterile water 10 mL IV syringe (1,000 mg IntraVENous Given 5/28/25 0439)         [place] Coma Scale  Eye Opening: Spontaneous  Best Verbal Response: Oriented  Best Motor Response: Obeys commands  [place] Coma Scale Score: 15        CC/HPI Summary, DDx, ED Course, and Reassessment: With hydration the patient's heart rate normalized.  She felt much better and wanted to be discharged.  Labs and imaging are significant only for cystitis and UTI.  She technically meets SIRS criteria but not severe sepsis or shock.  Advised patient to return for new or worsening symptoms.  I also reviewed her most recent urinary culture which was pansensitive.    Is this patient to be included in the SEP-1 Core Measure due to severe sepsis or septic shock?   No   Exclusion criteria - the patient is NOT to be included for SEP-1 Core Measure due to:  May have criteria for sepsis, but does not meet criteria for severe sepsis or septic shock  Records Reviewed: Last urine culture reviewed which was pansensitive.    Chronic Conditions: MS  Disposition Considerations: Admission strongly considered and decided against based on patient's

## 2025-05-29 LAB — BACTERIA UR CULT: NORMAL
